# Patient Record
Sex: FEMALE | Race: WHITE | NOT HISPANIC OR LATINO | Employment: UNEMPLOYED | URBAN - NONMETROPOLITAN AREA
[De-identification: names, ages, dates, MRNs, and addresses within clinical notes are randomized per-mention and may not be internally consistent; named-entity substitution may affect disease eponyms.]

---

## 2017-05-10 ENCOUNTER — HOSPITAL ENCOUNTER (INPATIENT)
Facility: HOSPITAL | Age: 37
LOS: 5 days | Discharge: HOME | DRG: 885 | End: 2017-05-15
Attending: PSYCHIATRY & NEUROLOGY | Admitting: PSYCHIATRY & NEUROLOGY
Payer: MEDICAID

## 2017-05-10 ENCOUNTER — TRANSFERRED RECORDS (OUTPATIENT)
Dept: HEALTH INFORMATION MANAGEMENT | Facility: HOSPITAL | Age: 37
End: 2017-05-10

## 2017-05-10 ENCOUNTER — MEDICAL CORRESPONDENCE (OUTPATIENT)
Dept: HEALTH INFORMATION MANAGEMENT | Facility: HOSPITAL | Age: 37
End: 2017-05-10

## 2017-05-10 DIAGNOSIS — F41.1 GAD (GENERALIZED ANXIETY DISORDER): Primary | ICD-10-CM

## 2017-05-10 PROBLEM — F29 PSYCHOSIS (H): Status: ACTIVE | Noted: 2017-05-10

## 2017-05-10 LAB
ALT SERPL-CCNC: 32 IU/L (ref 12–65)
AST SERPL-CCNC: 10 IU/L (ref 15–37)
CREAT SERPL-MCNC: 0.79 MG/DL (ref 0.6–1.3)
GLUCOSE SERPL-MCNC: 93 MG/DL (ref 70–100)
POTASSIUM SERPL-SCNC: 4.1 MMOL/L (ref 3.5–5.1)

## 2017-05-10 PROCEDURE — 12400000 ZZH R&B MH

## 2017-05-10 RX ORDER — OLANZAPINE 10 MG/1
10 TABLET ORAL
Status: DISCONTINUED | OUTPATIENT
Start: 2017-05-10 | End: 2017-05-15 | Stop reason: HOSPADM

## 2017-05-10 RX ORDER — ACETAMINOPHEN 325 MG/1
650 TABLET ORAL EVERY 4 HOURS PRN
Status: DISCONTINUED | OUTPATIENT
Start: 2017-05-10 | End: 2017-05-15 | Stop reason: HOSPADM

## 2017-05-10 RX ORDER — ALUMINA, MAGNESIA, AND SIMETHICONE 2400; 2400; 240 MG/30ML; MG/30ML; MG/30ML
30 SUSPENSION ORAL EVERY 4 HOURS PRN
Status: DISCONTINUED | OUTPATIENT
Start: 2017-05-10 | End: 2017-05-15 | Stop reason: HOSPADM

## 2017-05-10 RX ORDER — OLANZAPINE 10 MG/2ML
10 INJECTION, POWDER, FOR SOLUTION INTRAMUSCULAR
Status: DISCONTINUED | OUTPATIENT
Start: 2017-05-10 | End: 2017-05-15 | Stop reason: HOSPADM

## 2017-05-10 RX ORDER — TRAZODONE HYDROCHLORIDE 50 MG/1
50 TABLET, FILM COATED ORAL
Status: DISCONTINUED | OUTPATIENT
Start: 2017-05-10 | End: 2017-05-15 | Stop reason: HOSPADM

## 2017-05-10 RX ORDER — HYDROXYZINE HYDROCHLORIDE 25 MG/1
25-50 TABLET, FILM COATED ORAL EVERY 4 HOURS PRN
Status: DISCONTINUED | OUTPATIENT
Start: 2017-05-10 | End: 2017-05-11 | Stop reason: ALTCHOICE

## 2017-05-10 NOTE — IP AVS SNAPSHOT
[facility]    [address] [place] [address]    Phone:  [phone]    Fax:  [phone]                                       After Visit Summary   5/10/2017    Marely Seo    MRN: [id number]           After Visit Summary Signature Page     I have received my discharge instructions, and my questions have been answered. I have discussed any challenges I see with this plan with the nurse or doctor.    ..........................................................................................................................................  Patient/Patient Representative Signature      ..........................................................................................................................................  Patient Representative Print Name and Relationship to Patient    ..................................................               ................................................  Date                                            Time    ..........................................................................................................................................  Reviewed by Signature/Title    ...................................................              ..............................................  Date                                                            Time
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
## 2017-05-11 PROCEDURE — 25000132 ZZH RX MED GY IP 250 OP 250 PS 637: Performed by: NURSE PRACTITIONER

## 2017-05-11 PROCEDURE — A9585 GADOBUTROL INJECTION: HCPCS | Mod: TC

## 2017-05-11 PROCEDURE — 12400000 ZZH R&B MH

## 2017-05-11 PROCEDURE — 99223 1ST HOSP IP/OBS HIGH 75: CPT | Performed by: NURSE PRACTITIONER

## 2017-05-11 PROCEDURE — 70553 MRI BRAIN STEM W/O & W/DYE: CPT | Mod: TC

## 2017-05-11 RX ORDER — CARBAMAZEPINE 200 MG/1
200 TABLET ORAL AT BEDTIME
Status: ON HOLD | COMMUNITY
End: 2017-05-15

## 2017-05-11 RX ORDER — GABAPENTIN 100 MG/1
100 CAPSULE ORAL 3 TIMES DAILY
Status: DISCONTINUED | OUTPATIENT
Start: 2017-05-12 | End: 2017-05-15 | Stop reason: HOSPADM

## 2017-05-11 RX ORDER — PRAZOSIN HYDROCHLORIDE 1 MG/1
1 CAPSULE ORAL AT BEDTIME
Status: DISCONTINUED | OUTPATIENT
Start: 2017-05-12 | End: 2017-05-15 | Stop reason: HOSPADM

## 2017-05-11 RX ORDER — HYDROXYZINE PAMOATE 50 MG/1
50 CAPSULE ORAL 2 TIMES DAILY PRN
Status: DISCONTINUED | OUTPATIENT
Start: 2017-05-11 | End: 2017-05-15 | Stop reason: HOSPADM

## 2017-05-11 RX ORDER — GADOBUTROL 604.72 MG/ML
7.5 INJECTION INTRAVENOUS ONCE
Status: COMPLETED | OUTPATIENT
Start: 2017-05-11 | End: 2017-05-11

## 2017-05-11 RX ORDER — CARBAMAZEPINE 200 MG/1
200 TABLET ORAL AT BEDTIME
Status: DISCONTINUED | OUTPATIENT
Start: 2017-05-11 | End: 2017-05-11 | Stop reason: ALTCHOICE

## 2017-05-11 RX ORDER — CARBAMAZEPINE 100 MG/1
100 TABLET, CHEWABLE ORAL DAILY
Status: DISCONTINUED | OUTPATIENT
Start: 2017-05-11 | End: 2017-05-11 | Stop reason: ALTCHOICE

## 2017-05-11 RX ADMIN — CARBAMAZEPINE 100 MG: 100 TABLET, CHEWABLE ORAL at 11:59

## 2017-05-11 RX ADMIN — GADOBUTROL 7.5 ML: 604.72 INJECTION INTRAVENOUS at 13:09

## 2017-05-11 RX ADMIN — ACETAMINOPHEN 650 MG: 325 TABLET, FILM COATED ORAL at 11:47

## 2017-05-11 RX ADMIN — HYDROXYZINE HYDROCHLORIDE 25 MG: 25 TABLET ORAL at 00:11

## 2017-05-11 RX ADMIN — TRAZODONE HYDROCHLORIDE 50 MG: 50 TABLET ORAL at 00:08

## 2017-05-11 RX ADMIN — HYDROXYZINE HYDROCHLORIDE 25 MG: 25 TABLET ORAL at 09:26

## 2017-05-11 RX ADMIN — OLANZAPINE 10 MG: 10 TABLET, FILM COATED ORAL at 11:20

## 2017-05-11 ASSESSMENT — ACTIVITIES OF DAILY LIVING (ADL)
DRESS: SCRUBS (BEHAVIORAL HEALTH)
ORAL_HYGIENE: INDEPENDENT
GROOMING: INDEPENDENT
GROOMING: INDEPENDENT
ORAL_HYGIENE: INDEPENDENT
DRESS: INDEPENDENT;SCRUBS (BEHAVIORAL HEALTH)
LAUNDRY: UNABLE TO COMPLETE

## 2017-05-11 NOTE — PLAN OF CARE
Problem: Discharge Planning  Goal: Discharge Planning (Adult, OB, Behavioral, Peds)  Writer was unable to meet with the pt today- will meet with her tomorrow.     2:30- writer met with pt and completed psychosocial assessment- pt was disorganized, mumbled her answers and a bit irritable.  Writer put in a request for a hospital clergy to visit with her per her request.

## 2017-05-11 NOTE — PLAN OF CARE
ADMISSION NOTE    Reason for admission .psychosis [facility]  Pt had been in treatment for meth abuse and states had been there a few days and started hearing voices  Pt cooperative at present  Showered at length at pt request upon arriving on unit and got meal for pt as pt stated needed snack      Full skin assessment: pt has no bruises noted no tattoos  Skin dry on both feet but no open sores    Patient arrived on unit from  accompanied by  on 5/10/2017  11:04 PM.   Status on arrival: pt disorganized thought process    Patient given tour of unit and Welcome to  unit papers given to patient, wanding completed,   Patient's legal status on arrival is . Appropriate legal rights discussed with and copy given to patient. Patient Bill of Rights discussed with and copy given to patient.   Patient denies SI, HI, and thoughts of self harm and contracts for safety while on unit.      Mary Naqvi  5/10/2017  11:04 PM

## 2017-05-11 NOTE — PROGRESS NOTES
05/11/17 0044   Patient Belongings   Did you bring any home meds/supplements to the hospital?  No   Patient Belongings clothing   Disposition of Belongings shoes,coat,papers,bras,tote,5books,makeupbag,dice,tape,purse,glasses,,$4.46,curling iron,s.case of colthes,slippers,blowdryer,t.mug,watch   Belongings Search Yes   Clothing Search Yes   Second Staff afternoons   General Info Comment n/a    List items sent to safe: onehundred cash,cellphone,ebt card 2 id cardsAll other belongings put in assigned cubby in belongings room.     I have reviewed my belongings list on admission and verify that it is correct.     Patient signature_______________________________    Second staff witness (if patient unable to sign) ______________________________       I have received all my belongings at discharge.    Patient signature________________________________    Kelli5/11/2017  12:48 AM
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
## 2017-05-11 NOTE — PLAN OF CARE
"Problem: Goal Outcome Summary  Goal: Goal Outcome Summary  Pt denies SI and HI. Says she is here to stop using meth. Pt says the voices come and go. Pt states, \"they tell me negative things, like if you were talking to me they would say she doesn't really like you, she's just doing her job.\" Pt is disorganized in conversation and mumbles at times. Pt is irritable with this writer, especially during admission assessment. Pt states, \"I already answered all these questions, don't you people talk to each in there.\" Pt complains of anxiety and requested a PRN. PRN atarax 25 mg given at 0926. Pt reports a little relief but says she is scared to try something else. Pt appears to be paranoid. Noon vitals were taken and temperature was 99.1 pt became upset states, \"I think I need an ultrasound of my belly, I had the flu 3 weeks ago and I don't think it's gone.\" Re took pt temperature using oral thermometer it was 97.3, pt states \"I still feel like something is not healthy in my body.\"  PRN Zyprexa 10 mg was given at 1120.Pt then says she has pain \"all over my body.\" Tylenol 650 mg was given at 1147. Pt went for an MRI at 1240, results have not come back yet. Pt states, \"my feet and back are hurting from walking on this hard floor and i'm a germaphobe.\" Gave pt her slippers as they have a non skid bottom and no strings. Pt has been up on the unit socializing with peers and attending groups. She is complaint with prescribed medications.      Problem: Additional Goals: Nursing Focus  Goal: 1. Patient Goal: Nursing Focus  Pt can wear her slippers from home as long as behaviors remain appropriate.   Pt wearing slippers      "
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
## 2017-05-11 NOTE — PLAN OF CARE
Face to face end of shift report received from RHODA Sumner. Rounding completed. Patient observed in room.     Fariba Mccormick  5/11/2017  7:48 AM

## 2017-05-11 NOTE — PLAN OF CARE
Face to face end of shift report received from pm RN. Rounding completed. Patient observed.     Burak Pressley  5/11/2017  12:19 AM

## 2017-05-11 NOTE — PLAN OF CARE
Face to face end of shift report received from Fariba DUONG. Rounding completed. Patient observed.     Rama Ramos  5/11/2017  5:59 PM

## 2017-05-12 PROCEDURE — 12400000 ZZH R&B MH

## 2017-05-12 PROCEDURE — 25000132 ZZH RX MED GY IP 250 OP 250 PS 637: Performed by: NURSE PRACTITIONER

## 2017-05-12 PROCEDURE — 99232 SBSQ HOSP IP/OBS MODERATE 35: CPT | Performed by: NURSE PRACTITIONER

## 2017-05-12 RX ADMIN — MAGNESIUM HYDROXIDE 30 ML: 400 SUSPENSION ORAL at 20:23

## 2017-05-12 RX ADMIN — ACETAMINOPHEN 650 MG: 325 TABLET, FILM COATED ORAL at 09:21

## 2017-05-12 RX ADMIN — HYDROXYZINE PAMOATE 50 MG: 50 CAPSULE ORAL at 08:08

## 2017-05-12 RX ADMIN — GABAPENTIN 100 MG: 100 CAPSULE ORAL at 20:23

## 2017-05-12 RX ADMIN — GABAPENTIN 100 MG: 100 CAPSULE ORAL at 13:20

## 2017-05-12 RX ADMIN — ACETAMINOPHEN 650 MG: 325 TABLET, FILM COATED ORAL at 15:52

## 2017-05-12 RX ADMIN — GABAPENTIN 100 MG: 100 CAPSULE ORAL at 08:08

## 2017-05-12 RX ADMIN — PRAZOSIN HYDROCHLORIDE 1 MG: 1 CAPSULE ORAL at 20:23

## 2017-05-12 ASSESSMENT — ACTIVITIES OF DAILY LIVING (ADL)
LAUNDRY: UNABLE TO COMPLETE
DRESS: INDEPENDENT;SCRUBS (BEHAVIORAL HEALTH)
GROOMING: INDEPENDENT
LAUNDRY: UNABLE TO COMPLETE
GROOMING: INDEPENDENT
ORAL_HYGIENE: INDEPENDENT
DRESS: INDEPENDENT;SCRUBS (BEHAVIORAL HEALTH)
ORAL_HYGIENE: INDEPENDENT

## 2017-05-12 NOTE — PROGRESS NOTES
[facility]  Psychiatric Progress Note      Impression:   This is a 36 year old female with no clear history of a psychotic disorder who presents with sudden onset of auditory hallucinations in the last couple days concurrent with admission to a new treatment program and cessation of substances, including alcohol. Today Marely is still a bit disorganized in speech and thought. She does admit to having some auditory hallucinations but does not say what it is. Seh does complain of some ear pain or feeling full. No obvious signs of infection, will continue to monitor. She is in agreement with continuing current medications. She does feel that they are working well. She has been attending some groups.     Educated regarding medication indications, risks, benefits, side effects, contraindications and possible interactions. Verbally expressed understanding.        DIagnoses:     Psychosis NOS  Major Depressive Disorder, recurrent, unspecified  Anxiety, NOS  Alcohol Dependence, with withdrawal, in very early remission  Methamphetamine Abuse, in remission    Attestation:  Patient has been seen and evaluated by me,  Amelia Fam NP          Interim History:   The patient's care was discussed with the treatment team and chart notes were reviewed.          Medications:     Current Facility-Administered Medications Ordered in Epic   Medication Dose Route Frequency Last Rate Last Dose     hydrOXYzine (VISTARIL) capsule 50 mg  50 mg Oral BID PRN   50 mg at 05/12/17 0808     gabapentin (NEURONTIN) capsule 100 mg  100 mg Oral TID   100 mg at 05/12/17 1320     prazosin (MINIPRESS) capsule 1 mg  1 mg Oral At Bedtime         acetaminophen (TYLENOL) tablet 650 mg  650 mg Oral Q4H PRN   650 mg at 05/12/17 0921     alum & mag hydroxide-simethicone (MYLANTA ES/MAALOX  ES) suspension 30 mL  30 mL Oral Q4H PRN         magnesium hydroxide (MILK OF MAGNESIA) suspension 30 mL  30 mL Oral At Bedtime PRN         traZODone  (DESYREL) tablet 50 mg  50 mg Oral At Bedtime PRN   50 mg at 05/11/17 0008     OLANZapine (zyPREXA) tablet 10 mg  10 mg Oral Q2H PRN   10 mg at 05/11/17 1120    Or     OLANZapine (zyPREXA) injection 10 mg  10 mg Intramuscular Q2H PRN         nicotine polacrilex (NICORETTE) gum 2-4 mg  2-4 mg Buccal Q1H PRN         No current Epic-ordered outpatient prescriptions on file.              10 point ROS negative        Allergies:   No Known Allergies         Psychiatric Examination:   /56  Pulse 99  Temp 97.9  F (36.6  C) (Tympanic)  Resp 18  SpO2 98%  Weight is 0 lbs 0 oz  There is no height or weight on file to calculate BMI.    Appearance:  awake, alert, adequately groomed and dressed in hospital scrubs  Attitude:  cooperative  Eye Contact:  good  Mood:  anxious  Affect:  appropriate and in normal range  Speech:  clear, coherent  Psychomotor Behavior:  no evidence of tardive dyskinesia, dystonia, or tics and intact station, gait and muscle tone  Thought Process:  disorganized  Associations:  loosening of associations present  Thought Content:  no evidence of suicidal ideation or homicidal ideation and auditory hallucinations present  Insight:  fair  Judgment:  fair  Oriented to:  time, person, and place  Attention Span and Concentration:  fair  Recent and Remote Memory:  fair  Fund of Knowledge: low-normal  Muscle Strength and Tone: normal  Gait and Station: Normal           Labs:     Results for orders placed or performed during the hospital encounter of 05/10/17 (from the past 48 hour(s))   MR Brain w/o & w Contrast    Narrative    MRI OF BRAIN    CLINICAL INFORMATION:  History of head trauma with new onset of  psychosis.    TECHNICAL INFORMATION:  Sagittal T1, coronal post contrast enhanced T1  and axial FLAIR, T2, diffusion, T1 and post-contrast enhanced  T1-weighted images.    INTERPRETATION:  Ventricles, sulci and basilar cisterns are normal in  size for a patient of this age.  No mass or midline shift  is seen.  There is no extra-axial fluid collection or focal hemorrhage.  No  abnormal enhancement is seen following contrast administration.    Normal flow void is seen in vessels at the skull base.  Orbits appear  normal and symmetrical.  There is no restricted diffusion.  Midline  structures are within normal limits.    IMPRESSION:  NO INTRACRANIAL MASS EFFECT, HEMORRHAGE, OR ISCHEMIA.  Exam Date: May 11, 2017 01:18:00 PM  Author: SHERON KIM  This report is final and signed                  Plan:   Will continue current medications and continue to assess status to see if she clears as a result of tegretol being discontinued.

## 2017-05-12 NOTE — PLAN OF CARE
Problem: Discharge Planning  Goal: Discharge Planning (Adult, OB, Behavioral, Peds)  Writer spoke with Kellen the treatment director at [facility]- [phone] who stated they are faxing over the requested records- they will accept pt back to the program IF her primary problem is chemical dependency and not mental health- they would need the notes sent to them and they will review to see if pt is appropriate for their program.  They may be able to pick pt up from bus stop in [place] if they have the staff available.

## 2017-05-12 NOTE — PLAN OF CARE
Problem: Discharge Planning  Goal: Discharge Planning (Adult, OB, Behavioral, Peds)  Writer left a message for the treatment director- Kellen at [facility]-[phone]- in [place]- to touch base about pt returning to program when she discharges.

## 2017-05-12 NOTE — PLAN OF CARE
Face to face end of shift report received from pm RN. Rounding completed. Patient observed.     Burak Pressley  5/11/2017  11:47 PM

## 2017-05-12 NOTE — PLAN OF CARE
Problem: Goal Outcome Summary  Goal: Goal Outcome Summary  Outcome: No Change  Patient has been sleeping all evening. Mumbles when she responds to questions and returns to sleep. Respirations regular. Denies any suicidal thoughts. Denies hallucinations at this time. Did leave message at [facility] to call unit for fax number as only had answering machine on at this time.  2000 Patient was up for snack. Denies any hallucinations. States she does feel better. Did not know the town she was in. Calm.    Problem: Additional Goals: Nursing Focus  Goal: 1. Patient Goal: Nursing Focus  Pt can wear her slippers from home as long as behaviors remain appropriate.   Outcome: No Change  Patient has remained in bed all evening. Not wearing slippers at this time.

## 2017-05-12 NOTE — PLAN OF CARE
"Problem: Goal Outcome Summary  Goal: Goal Outcome Summary  Pt has been up on the unit socializing and attending groups. She does appear to be more organized then yesterday. Speech is mumbled. She requested and was given 12 step and allon books from belongings. Pt rated anxiety 5/10 this morning and was given Visteral 50 mg at 0808, she reports some relief. Pt continues to complain of pain in feet and back. She requested and was given tylenol 650 mg at 0921. Pt was in OT group rolling a ball on bottom of feet to try relieve pain as well. Pt denies depression. States, \"I think they found the right medication for me. I feel much better then when I was at [facility].\" Pt is complaint with prescribed medications. Faxed RANULFO to [facility] for pt records. Pt is asking to get EBT card from [employer] to call and see if she got the money she was suppose to get. Will pass along.     Problem: Additional Goals: Nursing Focus  Goal: 1. Patient Goal: Nursing Focus  Pt can wear her slippers from home as long as behaviors remain appropriate.   Pt has slippers on      "

## 2017-05-12 NOTE — PLAN OF CARE
Problem: Thought Process Alteration (Adult)  Goal: Improved Thought Process  Pt will attend at least 50% of group.   Pt will verbalize a decrease/absense of voices.  Outcome: Therapy, progress toward functional goals is gradual  Pt has attending most all groups this shift.  Pt denies hallucinations.

## 2017-05-12 NOTE — PLAN OF CARE
Face to face end of shift report received from Escobar RN. Rounding completed. Patient observed in room and introduced to nursing for the shift.    Osmani Frias  5/12/2017  4:17 PM

## 2017-05-12 NOTE — PLAN OF CARE
Face to face end of shift report received from RHODA Sumner. Rounding completed. Patient observed in [facility].     Fariba Mccormick  5/12/2017  7:43 AM

## 2017-05-13 ENCOUNTER — TRANSFERRED RECORDS (OUTPATIENT)
Dept: HEALTH INFORMATION MANAGEMENT | Facility: HOSPITAL | Age: 37
End: 2017-05-13

## 2017-05-13 PROCEDURE — 25000132 ZZH RX MED GY IP 250 OP 250 PS 637: Performed by: NURSE PRACTITIONER

## 2017-05-13 PROCEDURE — 12400000 ZZH R&B MH

## 2017-05-13 RX ADMIN — ACETAMINOPHEN 650 MG: 325 TABLET, FILM COATED ORAL at 01:06

## 2017-05-13 RX ADMIN — ACETAMINOPHEN 650 MG: 325 TABLET, FILM COATED ORAL at 20:50

## 2017-05-13 RX ADMIN — GABAPENTIN 100 MG: 100 CAPSULE ORAL at 20:50

## 2017-05-13 RX ADMIN — PRAZOSIN HYDROCHLORIDE 1 MG: 1 CAPSULE ORAL at 20:50

## 2017-05-13 RX ADMIN — GABAPENTIN 100 MG: 100 CAPSULE ORAL at 08:17

## 2017-05-13 RX ADMIN — GABAPENTIN 100 MG: 100 CAPSULE ORAL at 13:46

## 2017-05-13 RX ADMIN — MAGNESIUM HYDROXIDE 30 ML: 400 SUSPENSION ORAL at 21:35

## 2017-05-13 ASSESSMENT — ACTIVITIES OF DAILY LIVING (ADL)
DRESS: INDEPENDENT;SCRUBS (BEHAVIORAL HEALTH)
GROOMING: INDEPENDENT
LAUNDRY: UNABLE TO COMPLETE
ORAL_HYGIENE: INDEPENDENT

## 2017-05-13 NOTE — PLAN OF CARE
"Problem: Goal Outcome Summary  Goal: Goal Outcome Summary  She is up on the unit and is social with others. Her conversation is tangential with flight of ideas. She jumps from topic to topic. She currently denies hearing voices but admits to voices in the past. She says \"I woke up depressed again this morning\".  She denies any suicidal ideations.     Problem: Additional Goals: Nursing Focus  Goal: 1. Patient Goal: Nursing Focus  Pt can wear her slippers from home as long as behaviors remain appropriate.   Outcome: Change based on patient need/priority Date Met:  05/13/17  Behavior is appropriate  Goal: 2. Patient Goal: Nursing Focus  Pt may use Peanut Spiky Foot Massager PRN and it to return to staff when done. Will be kept in belongings room.   She is using her foot massager today. She is agreeable to return it to staff when done.     Problem: Thought Process Alteration (Adult)  Goal: Improved Thought Process  Pt will attend at least 50% of group.   Pt will verbalize a decrease/absense of voices.   She is encouraged to attend groups. She currently denies hearing voices but admits to them in the past.      "

## 2017-05-13 NOTE — PROGRESS NOTES
MARELY AREVALO - Requested visit by . Marely shared some of her story and the many significant losses in her life (now with some continued grieving). She noted her additions and treatments with the hope to go to [place] into a women's treatment center and then return to work some place near home ([place], [place]).  Her spirituality is a mixture of  and Pentecostalism.  We closed our time together in prayer.  She requested I bring her a rosary to be put with her belongings to take with her.

## 2017-05-13 NOTE — PLAN OF CARE
Problem: Goal Outcome Summary  Goal: Goal Outcome Summary  BEHAVIORAL TEAM DISCUSSION     Continued Stay Criteria/Rationale: on 72 hour hold due to psychosis, she continues to be tangential and have flight of ideas, denies current hallucinations  Plan: stabilize on medications  Participants: nursing and psychiatric nurse practitioner  Summary/Recommendation: Continue to stabilize with medication and work on discharge planning.  Medical/Physical: see H&P  Progress: some improvement

## 2017-05-13 NOTE — PLAN OF CARE
Face to face end of shift report received from pm RN. Rounding completed. Patient observed.     Burak Pressley  5/12/2017  11:34 PM

## 2017-05-13 NOTE — PLAN OF CARE
Face to face end of shift report received from RHODA Sumner. Rounding completed. Patient observed in room.      Yvan Lal  5/13/2017  7:36 AM

## 2017-05-13 NOTE — PLAN OF CARE
Problem: Goal Outcome Summary  Goal: Goal Outcome Summary  Outcome: Improving  Pt has been up and active this shift. She attended two of  The groups this evening and spent some time reading in her room. She is social with peers when in the dayroom. I talked with her about her plan to return to [facility] next week.  Pt has tangential thinking and struggled at times to stay focused during our conversation. Her thought content is mostly reality based but is disorganized.  Pt is was complaining of foot pain and asked for Tylenol and then shortly after said that she needed the Tylenol because she felt hot. She did not have a fever. She is complaining of having constipation and plans to have some MoM and prune juice at bedtime. She denies having any suicidal thoughts and does not have insight into her mental illness but does have some insight into her addiction.    Pt did get MoM and Prune juice with her HS meds    Problem: Additional Goals: Nursing Focus  Goal: 1. Patient Goal: Nursing Focus  Pt can wear her slippers from home as long as behaviors remain appropriate.   Outcome: Improving  Pt has been wearing her slippers without issues.  Goal: 2. Patient Goal: Nursing Focus  Pt may use Peanut Spiky Foot Massager PRN and it to return to staff when done. Will be kept in med room.   Outcome: Improving  Pt has not requested to use her massager this shift.    Problem: Thought Process Alteration (Adult)  Goal: Improved Thought Process  Pt will attend at least 50% of group.   Pt will verbalize a decrease/absense of voices.   Outcome: Improving  Pt has attended one of the groups. She denies hearing voices and does not look pre-occupied.

## 2017-05-14 PROCEDURE — 25000132 ZZH RX MED GY IP 250 OP 250 PS 637: Performed by: NURSE PRACTITIONER

## 2017-05-14 PROCEDURE — 12400000 ZZH R&B MH

## 2017-05-14 PROCEDURE — 99232 SBSQ HOSP IP/OBS MODERATE 35: CPT | Performed by: NURSE PRACTITIONER

## 2017-05-14 RX ADMIN — VITAMIN D, TAB 1000IU (100/BT) 2000 UNITS: 25 TAB at 14:29

## 2017-05-14 RX ADMIN — GABAPENTIN 100 MG: 100 CAPSULE ORAL at 13:27

## 2017-05-14 RX ADMIN — GABAPENTIN 100 MG: 100 CAPSULE ORAL at 08:21

## 2017-05-14 RX ADMIN — Medication 1 TABLET: at 14:29

## 2017-05-14 RX ADMIN — ACETAMINOPHEN 650 MG: 325 TABLET, FILM COATED ORAL at 10:37

## 2017-05-14 RX ADMIN — PRAZOSIN HYDROCHLORIDE 1 MG: 1 CAPSULE ORAL at 20:49

## 2017-05-14 RX ADMIN — GABAPENTIN 100 MG: 100 CAPSULE ORAL at 20:49

## 2017-05-14 ASSESSMENT — ACTIVITIES OF DAILY LIVING (ADL)
DRESS: INDEPENDENT;SCRUBS (BEHAVIORAL HEALTH)
ORAL_HYGIENE: INDEPENDENT
GROOMING: INDEPENDENT
LAUNDRY: UNABLE TO COMPLETE

## 2017-05-14 NOTE — PLAN OF CARE
Face to face end of shift report received from Yvan. Rounding completed. Patient observed in group    Osmani Frias  5/13/2017  11:02 PM

## 2017-05-14 NOTE — PLAN OF CARE
Problem: Goal Outcome Summary  Goal: Goal Outcome Summary  Outcome: Improving  Patient observed resting in his room. Respirations equal and non-labored. Appeared to have slept all noc thus far.

## 2017-05-14 NOTE — PLAN OF CARE
Face to face end of shift report received from Yvan DUONG. Rounding completed. Patient observed in dayroom  Osmanijo-ann Brownet  5/14/2017  6:34 PM

## 2017-05-14 NOTE — PLAN OF CARE
Problem: Goal Outcome Summary  Goal: Goal Outcome Summary  Outcome: Improving  Pt has been active and social with peers all evening. She attended all but one of the available groups tonight. Her thinking and speech are somewhat more organized.  She told me that she feels that she is ready to go back to treatment.  Her affect is bright and she smiled often this shift.  She did have some generalized pain at bedtime and was given Tylenol with her HS medications. She was given some teaching on her Gabapentin and Prazosin and wants to be sure that her treatment center will approve of these medications.  She hopes she can go back as soon as Monday.    Problem: Additional Goals: Nursing Focus  Goal: 2. Patient Goal: Nursing Focus  Pt may use Peanut Spiky Foot Massager PRN and it to return to staff when done. Will be kept belongings room.   Outcome: Improving  `Pt has used her Spiky foot massager appropriatly    Problem: Thought Process Alteration (Adult)  Goal: Improved Thought Process  Pt will attend at least 50% of group.   Pt will verbalize a decrease/absense of voices.   Outcome: Improving  Pt did not attend groups but does deny voices.

## 2017-05-14 NOTE — PROGRESS NOTES
[facility]  Psychiatric Progress Note      Impression:   This is a 36 year old female with no clear history of a psychotic disorder who presents with sudden onset of auditory hallucinations in the last couple days concurrent with admission to a new treatment program and cessation of substances, including alcohol. Marely is more logical and linear today. She is more pleasant in conversation and is better able to track a linear thought process. She denies any hallucinations. Marely feel her medications are working well and she feels she is sleeping better.     Educated regarding medication indications, risks, benefits, side effects, contraindications and possible interactions. Verbally expressed understanding.        DIagnoses:     Psychosis NOS  Major Depressive Disorder, recurrent, unspecified  Anxiety, NOS  Alcohol Dependence, with withdrawal, in very early remission  Methamphetamine Abuse, in remission    Attestation:  Patient has been seen and evaluated by me,  Amelia Fam NP          Interim History:   The patient's care was discussed with the treatment team and chart notes were reviewed.          Medications:     Current Facility-Administered Medications Ordered in Epic   Medication Dose Route Frequency Last Rate Last Dose     hydrOXYzine (VISTARIL) capsule 50 mg  50 mg Oral BID PRN   50 mg at 05/12/17 0808     gabapentin (NEURONTIN) capsule 100 mg  100 mg Oral TID   100 mg at 05/12/17 1320     prazosin (MINIPRESS) capsule 1 mg  1 mg Oral At Bedtime         acetaminophen (TYLENOL) tablet 650 mg  650 mg Oral Q4H PRN   650 mg at 05/12/17 0921     alum & mag hydroxide-simethicone (MYLANTA ES/MAALOX  ES) suspension 30 mL  30 mL Oral Q4H PRN         magnesium hydroxide (MILK OF MAGNESIA) suspension 30 mL  30 mL Oral At Bedtime PRN         traZODone (DESYREL) tablet 50 mg  50 mg Oral At Bedtime PRN   50 mg at 05/11/17 0008     OLANZapine (zyPREXA) tablet 10 mg  10 mg Oral Q2H PRN   10 mg at 05/11/17  "1120    Or     OLANZapine (zyPREXA) injection 10 mg  10 mg Intramuscular Q2H PRN         nicotine polacrilex (NICORETTE) gum 2-4 mg  2-4 mg Buccal Q1H PRN         No current Epic-ordered outpatient prescriptions on file.              10 point ROS negative        Allergies:   No Known Allergies         Psychiatric Examination:   /54  Pulse 93  Temp 97.8  F (36.6  C) (Tympanic)  Resp 16  Ht 1.753 m (5' 9\")  Wt 87.2 kg (192 lb 3.2 oz)  SpO2 99%  BMI 28.38 kg/m2  Weight is 192 lbs 3.2 oz  Body mass index is 28.38 kg/(m^2).    Appearance:  awake, alert, adequately groomed and dressed in hospital scrubs  Attitude: remains  cooperative  Eye Contact:  good  Mood: less anxious  Affect: mood congruent  Speech:  clear, coherent  Psychomotor Behavior:  no evidence of tardive dyskinesia, dystonia, or tics and intact station, gait and muscle tone  Thought Process: more linear  Associations: improved, more logical and linear  Thought Content:  no evidence of suicidal ideation or homicidal ideation and auditory hallucinations present  Insight:  fair  Judgment:  fair  Oriented to:  time, person, and place  Attention Span and Concentration:  fair  Recent and Remote Memory:  fair  Fund of Knowledge: low-normal  Muscle Strength and Tone: normal  Gait and Station: Normal           Labs:     No results found for this or any previous visit (from the past 48 hour(s)).             Plan:   Vitamin D started   Vitamin B supplement started       "

## 2017-05-14 NOTE — PLAN OF CARE
Face to face end of shift report received from RHODA Oliveira. Rounding completed. Patient observed in [facility].     Yvan Lal  5/14/2017  7:44 AM

## 2017-05-14 NOTE — PLAN OF CARE
Problem: Goal Outcome Summary  Goal: Goal Outcome Summary  She is up on the unit and is social with others. She continues with some depression. She denies any suicidal thinking. She denies having hallucinations. She is maintaining appropriate boundaries with staff and peers. She talks of her feet hurting and feeling swollen. Feet examined. She has no edema. Skin on the ends of her toes is dry. Patient given lotion for her feet. She does have some anxiety today and is writing in her journal.     Problem: Additional Goals: Nursing Focus  Goal: 2. Patient Goal: Nursing Focus  Pt may use Peanut Spiky Foot Massager PRN and it to return to staff when done. Will be kept belongings room.   She is using her foot massager while sitting in the lounge.    Problem: Thought Process Alteration (Adult)  Goal: Improved Thought Process  Pt will attend at least 50% of group.   Pt will verbalize a decrease/absense of voices.   Up on the unit. Sitting in the lounge. Denies hallucinations.

## 2017-05-15 VITALS
SYSTOLIC BLOOD PRESSURE: 108 MMHG | DIASTOLIC BLOOD PRESSURE: 63 MMHG | WEIGHT: 192.2 LBS | OXYGEN SATURATION: 97 % | BODY MASS INDEX: 28.47 KG/M2 | RESPIRATION RATE: 14 BRPM | TEMPERATURE: 98 F | HEART RATE: 82 BPM | HEIGHT: 69 IN

## 2017-05-15 PROCEDURE — 25000132 ZZH RX MED GY IP 250 OP 250 PS 637: Performed by: NURSE PRACTITIONER

## 2017-05-15 PROCEDURE — 99239 HOSP IP/OBS DSCHRG MGMT >30: CPT | Performed by: NURSE PRACTITIONER

## 2017-05-15 RX ORDER — PRAZOSIN HYDROCHLORIDE 1 MG/1
1 CAPSULE ORAL AT BEDTIME
Qty: 30 CAPSULE | Refills: 0 | Status: ON HOLD | OUTPATIENT
Start: 2017-05-15 | End: 2022-08-24

## 2017-05-15 RX ORDER — HYDROXYZINE PAMOATE 50 MG/1
50 CAPSULE ORAL 2 TIMES DAILY PRN
Qty: 60 CAPSULE | Refills: 0 | Status: ON HOLD | OUTPATIENT
Start: 2017-05-15 | End: 2022-08-24

## 2017-05-15 RX ORDER — GABAPENTIN 100 MG/1
100 CAPSULE ORAL 3 TIMES DAILY
Qty: 90 CAPSULE | Refills: 0 | Status: ON HOLD | OUTPATIENT
Start: 2017-05-15 | End: 2022-08-24

## 2017-05-15 RX ADMIN — ACETAMINOPHEN 650 MG: 325 TABLET, FILM COATED ORAL at 04:55

## 2017-05-15 RX ADMIN — GABAPENTIN 100 MG: 100 CAPSULE ORAL at 08:56

## 2017-05-15 RX ADMIN — Medication 1 TABLET: at 08:56

## 2017-05-15 RX ADMIN — GABAPENTIN 100 MG: 100 CAPSULE ORAL at 13:56

## 2017-05-15 RX ADMIN — VITAMIN D, TAB 1000IU (100/BT) 2000 UNITS: 25 TAB at 08:56

## 2017-05-15 ASSESSMENT — ACTIVITIES OF DAILY LIVING (ADL)
LAUNDRY: UNABLE TO COMPLETE
GROOMING: INDEPENDENT
ORAL_HYGIENE: INDEPENDENT
DRESS: SCRUBS (BEHAVIORAL HEALTH);INDEPENDENT

## 2017-05-15 NOTE — PLAN OF CARE
"Problem: Goal Outcome Summary  Goal: Goal Outcome Summary  Patient denies SI, HI, hallucinations.  Patient is fixated on discharge this shift.  Affect is flat, she is irritable and tense.  Medication compliant, education given on [employer]ip[employer].  Patient is not attending groups.  She has been making multiple phone calls this shift attempting to find placement at a treatment center.  \"I won't go somewhere that strips me down like you all did.  I know my rights.\"    Problem: Additional Goals: Nursing Focus  Goal: 2. Patient Goal: Nursing Focus  Pt may use Peanut Spiky Foot Massager PRN and it to return to staff when done. Will be kept belongings room.   Patient allowed to use    Problem: Thought Process Alteration (Adult)  Goal: Improved Thought Process  Pt will attend at least 50% of group.   Pt will verbalize a decrease/absense of voices.   Patient encouraged to attend groups      "

## 2017-05-15 NOTE — PLAN OF CARE
"Problem: Discharge Planning  Goal: Discharge Planning (Adult, OB, Behavioral, Peds)  Writer spoke with Kellen from [facility] [place]- [phone] who stated that they are concerned because pt's behaviors were present before she was started on the new medication.  They believe she might be better suited for MH treatment- their Nurse Practitioner is there today and they want the weekend's notes faxed so the NP can review and they can determine if they are going to accept back to the program.  Writer let them know pt has cleared, been doing better, attending groups and is ready to discharge.    [facility] called back and stated they reviewed the notes and will not accept pt back to program.      Writer let the pt know that she could not go back to [facility]- pt stated, \"Well, then I am out- I am not staying past my hold- I will get [place] transportation to come pick me up.\"   "

## 2017-05-15 NOTE — PLAN OF CARE
Problem: Discharge Planning  Goal: Discharge Planning (Adult, OB, Behavioral, Peds)  Outcome: No Change  Sent notes to [facility], fax [phone], as requested.

## 2017-05-15 NOTE — PROGRESS NOTES
Face to face end of shift report received from desire rn at 2300 report.. Rounding completed. Patient observed.     Yanni Smith  5/15/2017  1:48 AM

## 2017-05-15 NOTE — DISCHARGE INSTRUCTIONS
Behavioral Discharge Planning and Instructions    Summary: Marely was admitted for psychosis.     Main Diagnosis: Psychosis NOS, Major Depressive Disorder, recurrent, unspecified, Anxiety, NOS, Alcohol Dependence, with withdrawal, in very early remission, Methamphetamine Abuse, in remission    Major Treatments, Procedures and Findings: Stabilize with medications, connect with community programs.     Symptoms to Report: feeling more aggressive, increased confusion, losing more sleep, mood getting worse or thoughts of suicide    Lifestyle Adjustment: Take all medications as prescribed, meet with doctor/ medication provider, out patient therapist, , and [facility] worker as scheduled. Abstain from alcohol or any unprescribed drugs.     Psychiatry Follow-up:    [facility]  [address]  [place] [address]   [phone]    [facility]: Director: Kellen [phone]  [address]  [place] [address]  Phone: [phone]  Fax: [phone]    Resources:   Crisis Intervention: 823.452.3653 or 594-901-7562 (TTY: 319.837.9601).  Call anytime for help.  National Many on Mental Illness (www.[place].katey.org): 946.834.3828 or 839-003-2509.  Alcoholics Anonymous (www.alcoholics-anonymous.org): Check your phone book for your local chapter.  Suicide Awareness Voices of Education (SAVE) (www.save.org): 504-025-FEOO (4083)  National Suicide Prevention Line (www.mentalhealthmn.org): 140-371-FWSX (5147)  Mental Health Consumer/Survivor Network of [place] (www.mhcsn.net): 346.978.8296 or 149-707-8337  Mental Health Association of [place] (www.mentalhealth.org): 605.127.3306 or 119-609-3763    General Medication Instructions:   See your medication sheet(s) for instructions.   Take all medicines as directed.  Make no changes unless your doctor suggests them.   Go to all your doctor visits.  Be sure to have all your required lab tests. This way, your medicines can be refilled on time.  Do not use any  drugs not prescribed by your doctor.  Avoid alcohol.    [place] Area:  [facility], Crisis stabilization \A Chronology of [place] Hospitals\""- [phone]  [facility] Crisis Line: 1-476.726.4795  Advocates For Family Peace: 884-7270  Sexual Assault Program of [facility]: 345.819.1499 or 1-250.217.6581  [place] Battered Women's Program: 2-171-142-1103 Ext: 279       Calls answered Mon-Fri-8:00 am--4:30 pm    [place]:  Advocates for Family Peace: 0-010-300-8284  [facility] first call for help: 9-156-047-4942  [facility]:  (664) 264-7464      [place] Area:  Warm Line: 1-919.795.9453       Calls answered Tuesday--Saturday 4:00 pm--10:00 pm  [facility] Crisis Line - 940.223.8884  [facility] Crisis Stabilization 687-057-6630    [place] Statewide:  [place] Crisis and Referral Services: 1-872.309.1057  National Suicide Prevention Lifeline: 6-415-373-TALK (0082)   - rip3taej- Text  Life  to 83996  First Call for Help: 2-1-1  [facility] Helpline- 7-612-QJUP-HELP

## 2017-05-15 NOTE — PLAN OF CARE
Problem: Discharge Planning  Goal: Discharge Planning (Adult, OB, Behavioral, Peds)  Pt is discharging at the recommendation of the treatment team. Pt is discharging to parent's home transported by parents. Pt denies having any thoughts of hurting themself or anyone else. Pt denies anxiety or depression. Pt has follow up with [facility]. Discharge instructions, including; demographic sheet, psychiatric evaluation, discharge summary, and AVS were faxed to these next level of care providers.

## 2017-05-15 NOTE — PLAN OF CARE
Problem: Goal Outcome Summary  Goal: Goal Outcome Summary  0100 in bed with easy respirations presenting sleeping.0300 awake in bed. Wanted some prune juice. We did not have any but she accepted some grape juice. 0330 then into the shower. She said that the room mate woke her up. Did offer the patient a room change and she said no. Patient said that she is going today. She did ask for and given her prn tylenol 650mg for overall body aches at 0455. She is going to rest in her bed now.

## 2017-05-15 NOTE — PLAN OF CARE
Face to face end of shift report received from Yanni AGUIRRE RN. Rounding completed. Patient observed in [facility].    Geovanna Ramirez  5/15/2017  8:06 AM

## 2017-05-15 NOTE — PLAN OF CARE
Problem: Goal Outcome Summary  Goal: Goal Outcome Summary  Outcome: Improving  Pt has been up and active all shift. She attended all the available groups and was social with peers in the dayroom.  She reported that she feels that her thinking is more organized.  We discussed the triggers that may have contributed to her having disorganized thinking.  She said that she does not know exactly. She feels that she is ready for discharge back to her treatment center. She denied voices or feeling paranoid. Her affect is bright and she smiles a great deal.  She was given more teaching on her medications.      Problem: Additional Goals: Nursing Focus  Goal: 2. Patient Goal: Nursing Focus  Pt may use Peanut Spiky Foot Massager PRN and it to return to staff when done. Will be kept belongings room.   Outcome: Improving  Pt has used her foot massager taqueria    Problem: Thought Process Alteration (Adult)  Goal: Improved Thought Process  Pt will attend at least 50% of group.   Pt will verbalize a decrease/absense of voices.   Outcome: Improving  Pt has attended all groups and denies voices

## 2017-05-15 NOTE — DISCHARGE SUMMARY
"Psychiatric Discharge Summary    Marely Seo MRN# [id number]   Age: 36 year old YOB: 1980     Date of Admission:  5/10/2017  Date of Discharge:  5/15/2017  Admitting Physician:  Marquez Spivey MD  Discharge Physician:  Amelia Fam NP          Event Leading to Hospitalization and Hospital Stay   Marely Seo is a 36 year old female who presented via [facility] ED with reports of sudden onset of symptoms of severe auditory hallucinations for last couple days with rapid progression. She reported symptoms triggered by recent admission into treatment and cessation of methamphetamine use. No previous diagnosis or treatment for psychotic disorder. Parents reported that there is no history of mental health issues.      Marely is extremely disorganized and somewhat paranoid, but her insight into this is interestingly good. She is unable to have a conversation in her room, secondary to fearing her \"reaction in closed quarters,\" and requests that we talk in the lounge area near the window. She does endorse auditory hallucinations, which she describes as a mixture of voices and her own racing thoughts. She tells me, \"I have monkey on my shoulder,that's what I have.\" When asked about previous mental health issues, she does report being diagnosed with depression and anxiety, \"possibly induced from drug use,\" and believes that at one time she was treated with Sertraline, \"or strattera - maybe I need ADHD meds?\" Marely also indicates that she has previously had auditory hallucinations, \"usually when I quit drinking and drugging and I'm introduced to an environment that I'm not comfortable with.\" When asked about treatment, or how the voices generally resolve and how long that takes, she answers, \"they make me cry.\" She then tells me the voices are there because of her daughter's father who recently lost a son, \"it's bad shit and the reality is coming down on me and I've had a brain injury.\" When " "asked to elaborate on that, she requests we wait so she can give full detail. She then talks more about her alcohol and methamphetamine use, previous 10 CD treatments, and attendance at [facility] because she believes her addiction is \"a family issue and I'm just trying to understand it.\" She has not used any substances in a week. Marely does go back to the brain injury, telling me, \"we need to talk about the marijuana. 16. I fell and hit my head and I had a seizure. There was an egg. I was at the movies. I don't think anyone ever made sure my brain was ok.\"  Tegretol was discontinued as it has ototoxic properties that can induce auditory hallucinations. Gabapentin and prazosin were started and symptoms seemed to diminish quickly. Marely denied any paranoia or auditory hallucinations by discharge. She was not able to return to her inpatient CD treatment facility due to having a primary MH diagnosis. She is planning on returning to her mothers and working with the [facility] to find another treatment facility that will work with dual diagnosis.          At time of discharge, there is no evidence that patient is in immediate danger of self or others.        DIagnoses:     Psychosis NOS  Major Depressive Disorder, recurrent, unspecified  Anxiety, NOS  Alcohol Dependence, with withdrawal, in very early remission  Methamphetamine Abuse, in remission         Labs:   No results found for this or any previous visit (from the past 24 hour(s)).               Discharge Medications:     Current Discharge Medication List      START taking these medications    Details   gabapentin (NEURONTIN) 100 MG capsule Take 1 capsule (100 mg) by mouth 3 times daily  Qty: 90 capsule, Refills: 0    Associated Diagnoses: GORDY (generalized anxiety disorder)      prazosin (MINIPRESS) 1 MG capsule Take 1 capsule (1 mg) by mouth At Bedtime  Qty: 30 capsule, Refills: 0    Associated Diagnoses: GORDY (generalized anxiety disorder)         CONTINUE these medications " which have CHANGED    Details   hydrOXYzine (VISTARIL) 50 MG capsule Take 1 capsule (50 mg) by mouth 2 times daily as needed for anxiety  Qty: 60 capsule, Refills: 0    Associated Diagnoses: GORDY (generalized anxiety disorder)         STOP taking these medications       CARBAMAZEPINE PO Comments:   Reason for Stopping:         carBAMazepine (TEGRETOL) 200 MG tablet Comments:   Reason for Stopping:                 Justification for dual anti-psychotic use: not applicable       Psychiatric Examination:   Appearance:  awake, alert, adequately groomed and dressed in hospital scrubs  Attitude:  cooperative  Eye Contact:  good  Mood:  good  Affect:  appropriate and in normal range  Speech:  clear, coherent  Psychomotor Behavior:  no evidence of tardive dyskinesia, dystonia, or tics and intact station, gait and muscle tone  Thought Process:  logical, linear and goal oriented  Associations:  no loose associations  Thought Content:  no evidence of suicidal ideation or homicidal ideation, no evidence of psychotic thought, no auditory hallucinations present and no visual hallucinations present  Insight:  good  Judgment:  intact  Oriented to:  time, person, and place  Attention Span and Concentration:  intact  Recent and Remote Memory:  intact  Fund of Knowledge: low-normal  Muscle Strength and Tone: normal  Gait and Station: Normal  Perception: No perceptual disturbances noted       Discharge Plan:   Psychiatry Follow-up:     [facility]  [address]  [place] [address]   [phone]     [facility]: Director: Kellen [phone]  [facility] [address]  Phone: [phone]  Fax: [phone]    Attestation:  The patient has been seen and evaluated by me,  Amelia Fam NP         Discharge Services Provided:    60 minutes spent on discharge services, including:  Final examination of patient.  Review and discussion of Hospital stay.  Instructions for continued outpatient  care/goals.  Preparation of discharge records.  Preparation of medications refills and new prescriptions.  Preparation of Applicable referral forms.

## 2017-05-16 NOTE — PLAN OF CARE
Face to face end of shift report received from Geovanna CABRERA RN. Rounding completed. Patient observed.     Isidoro Loya  5/15/2017  9:56 PM

## 2017-05-16 NOTE — PLAN OF CARE
Discharge Note    Patient Discharged to home on 5/15/2017 9:59 PM via Private Car accompanied by cousin and staff to the door.     Patient informed of discharge instructions in AVS. patient verbalizes understanding and denies having any questions pertaining to AVS. Patient stable at time of discharge. Patient denies SI, HI, and thoughts of self harm at time of discharge. All personal belongings returned to patient. Discharge prescriptions sent to [facility] via electronic communication. Patient took home meds from [facility] pharmacy which were delivered to the unit. Psych evaluation, history and physical, AVS, and discharge summary faxed to next level of care by  MIRANDA.     Isidoro Loya  5/15/2017  9:59 PM

## 2022-08-23 ENCOUNTER — TRANSFERRED RECORDS (OUTPATIENT)
Dept: HEALTH INFORMATION MANAGEMENT | Facility: CLINIC | Age: 42
End: 2022-08-23

## 2022-08-24 ENCOUNTER — HOSPITAL ENCOUNTER (INPATIENT)
Facility: HOSPITAL | Age: 42
LOS: 7 days | Discharge: HOME | End: 2022-08-31
Attending: STUDENT IN AN ORGANIZED HEALTH CARE EDUCATION/TRAINING PROGRAM | Admitting: STUDENT IN AN ORGANIZED HEALTH CARE EDUCATION/TRAINING PROGRAM
Payer: COMMERCIAL

## 2022-08-24 ENCOUNTER — TRANSFERRED RECORDS (OUTPATIENT)
Dept: BEHAVIORAL HEALTH | Facility: HOSPITAL | Age: 42
End: 2022-08-24

## 2022-08-24 DIAGNOSIS — T50.905A WEIGHT GAIN DUE TO MEDICATION: ICD-10-CM

## 2022-08-24 DIAGNOSIS — F20.3 UNDIFFERENTIATED SCHIZOPHRENIA (H): Primary | ICD-10-CM

## 2022-08-24 DIAGNOSIS — R63.5 WEIGHT GAIN DUE TO MEDICATION: ICD-10-CM

## 2022-08-24 PROBLEM — R44.3 HALLUCINATIONS: Status: ACTIVE | Noted: 2020-12-30

## 2022-08-24 PROBLEM — F15.20 METHAMPHETAMINE ADDICTION (H): Status: ACTIVE | Noted: 2020-12-30

## 2022-08-24 PROBLEM — F23 PSYCHOTIC EPISODE (H): Status: ACTIVE | Noted: 2017-06-08

## 2022-08-24 PROBLEM — F15.20 OTHER STIMULANT DEPENDENCE, UNCOMPLICATED (H): Status: ACTIVE | Noted: 2020-12-30

## 2022-08-24 PROBLEM — S83.512D RUPTURE OF ANTERIOR CRUCIATE LIGAMENT OF LEFT KNEE, SUBSEQUENT ENCOUNTER: Status: ACTIVE | Noted: 2020-07-17

## 2022-08-24 PROBLEM — R19.8 ABDOMINAL WEAKNESS: Status: ACTIVE | Noted: 2018-02-20

## 2022-08-24 PROBLEM — M46.1 SACROILIITIS (H): Status: ACTIVE | Noted: 2018-12-10

## 2022-08-24 PROBLEM — F10.10 ALCOHOL ABUSE: Status: ACTIVE | Noted: 2022-06-19

## 2022-08-24 PROBLEM — F19.10 SUBSTANCE ABUSE (H): Status: ACTIVE | Noted: 2017-06-08

## 2022-08-24 PROBLEM — E78.6 HYPERLIPIDEMIA WITH LOW HDL: Status: ACTIVE | Noted: 2018-05-17

## 2022-08-24 PROBLEM — F10.20 ALCOHOLISM (H): Status: ACTIVE | Noted: 2018-08-16

## 2022-08-24 PROBLEM — F43.89 OTHER REACTIONS TO SEVERE STRESS: Status: ACTIVE | Noted: 2021-09-20

## 2022-08-24 PROBLEM — M54.9 BACKACHE: Status: ACTIVE | Noted: 2022-08-24

## 2022-08-24 PROBLEM — R87.810 CERVICAL HIGH RISK HPV (HUMAN PAPILLOMAVIRUS) TEST POSITIVE: Status: ACTIVE | Noted: 2018-05-17

## 2022-08-24 PROBLEM — M47.817 FACET ARTHROPATHY, LUMBOSACRAL: Status: ACTIVE | Noted: 2018-09-13

## 2022-08-24 PROBLEM — E78.5 HYPERLIPIDEMIA WITH LOW HDL: Status: ACTIVE | Noted: 2018-05-17

## 2022-08-24 PROBLEM — F32.9 MAJOR DEPRESSION: Status: ACTIVE | Noted: 2022-08-24

## 2022-08-24 PROBLEM — F41.9 ANXIETY: Status: ACTIVE | Noted: 2020-12-30

## 2022-08-24 PROBLEM — F15.259: Status: ACTIVE | Noted: 2021-09-20

## 2022-08-24 PROBLEM — F23 ACUTE PSYCHOSIS (H): Status: ACTIVE | Noted: 2017-06-08

## 2022-08-24 PROBLEM — F25.1 SCHIZOAFFECTIVE DISORDER, DEPRESSIVE TYPE (H): Status: ACTIVE | Noted: 2021-12-28

## 2022-08-24 PROBLEM — F19.20 SUBSTANCE ADDICTION (H): Status: ACTIVE | Noted: 2020-08-17

## 2022-08-24 PROBLEM — F15.10 METHAMPHETAMINE ABUSE (H): Status: ACTIVE | Noted: 2022-06-19

## 2022-08-24 LAB
ALBUMIN SERPL-MCNC: 3.5 G/DL (ref 3.4–5)
ANION GAP SERPL CALCULATED.3IONS-SCNC: 5 MMOL/L (ref 3–14)
BUN SERPL-MCNC: 9 MG/DL (ref 7–30)
CALCIUM SERPL-MCNC: 9.2 MG/DL (ref 8.5–10.1)
CHLORIDE BLD-SCNC: 111 MMOL/L (ref 94–109)
CO2 SERPL-SCNC: 23 MMOL/L (ref 20–32)
CREAT SERPL-MCNC: 0.8 MG/DL (ref 0.52–1.04)
GFR SERPL CREATININE-BSD FRML MDRD: >90 ML/MIN/1.73M2
GLUCOSE BLD-MCNC: 116 MG/DL (ref 70–99)
PHOSPHATE SERPL-MCNC: 3.5 MG/DL (ref 2.5–4.5)
POTASSIUM BLD-SCNC: 4 MMOL/L (ref 3.4–5.3)
POTASSIUM BLD-SCNC: 4 MMOL/L (ref 3.4–5.3)
SODIUM SERPL-SCNC: 139 MMOL/L (ref 133–144)

## 2022-08-24 PROCEDURE — 250N000013 HC RX MED GY IP 250 OP 250 PS 637: Performed by: NURSE PRACTITIONER

## 2022-08-24 PROCEDURE — 124N000001 HC R&B MH

## 2022-08-24 PROCEDURE — 99223 1ST HOSP IP/OBS HIGH 75: CPT | Mod: AI | Performed by: NURSE PRACTITIONER

## 2022-08-24 PROCEDURE — 99222 1ST HOSP IP/OBS MODERATE 55: CPT | Performed by: NURSE PRACTITIONER

## 2022-08-24 PROCEDURE — 82310 ASSAY OF CALCIUM: CPT | Performed by: NURSE PRACTITIONER

## 2022-08-24 PROCEDURE — 36415 COLL VENOUS BLD VENIPUNCTURE: CPT | Performed by: NURSE PRACTITIONER

## 2022-08-24 PROCEDURE — 84132 ASSAY OF SERUM POTASSIUM: CPT | Performed by: NURSE PRACTITIONER

## 2022-08-24 RX ORDER — SIMETHICONE 80 MG
80 TABLET,CHEWABLE ORAL EVERY 6 HOURS PRN
Status: DISCONTINUED | OUTPATIENT
Start: 2022-08-24 | End: 2022-08-31 | Stop reason: HOSPADM

## 2022-08-24 RX ORDER — MAGNESIUM HYDROXIDE/ALUMINUM HYDROXICE/SIMETHICONE 120; 1200; 1200 MG/30ML; MG/30ML; MG/30ML
30 SUSPENSION ORAL EVERY 4 HOURS PRN
Status: DISCONTINUED | OUTPATIENT
Start: 2022-08-24 | End: 2022-08-31 | Stop reason: HOSPADM

## 2022-08-24 RX ORDER — VALACYCLOVIR HYDROCHLORIDE 500 MG/1
1000 TABLET, FILM COATED ORAL DAILY
Status: COMPLETED | OUTPATIENT
Start: 2022-08-24 | End: 2022-08-28

## 2022-08-24 RX ORDER — PALIPERIDONE 3 MG/1
3 TABLET, EXTENDED RELEASE ORAL AT BEDTIME
Status: DISCONTINUED | OUTPATIENT
Start: 2022-08-24 | End: 2022-08-25

## 2022-08-24 RX ORDER — ACETAMINOPHEN 325 MG/1
650 TABLET ORAL EVERY 4 HOURS PRN
Status: DISCONTINUED | OUTPATIENT
Start: 2022-08-24 | End: 2022-08-31 | Stop reason: HOSPADM

## 2022-08-24 RX ORDER — AMOXICILLIN 250 MG
1 CAPSULE ORAL 2 TIMES DAILY PRN
Status: DISCONTINUED | OUTPATIENT
Start: 2022-08-24 | End: 2022-08-31 | Stop reason: HOSPADM

## 2022-08-24 RX ORDER — PHENOL 1.4 %
20-30 AEROSOL, SPRAY (ML) MUCOUS MEMBRANE
COMMUNITY
Start: 2022-01-02

## 2022-08-24 RX ORDER — QUETIAPINE FUMARATE 100 MG/1
100 TABLET, FILM COATED ORAL AT BEDTIME
Status: DISCONTINUED | OUTPATIENT
Start: 2022-08-24 | End: 2022-08-25

## 2022-08-24 RX ORDER — MEDROXYPROGESTERONE ACETATE 150 MG/ML
150 INJECTION, SUSPENSION INTRAMUSCULAR
COMMUNITY
Start: 2022-04-18 | End: 2023-06-12

## 2022-08-24 RX ORDER — OLANZAPINE 10 MG/2ML
10 INJECTION, POWDER, FOR SOLUTION INTRAMUSCULAR 3 TIMES DAILY PRN
Status: DISCONTINUED | OUTPATIENT
Start: 2022-08-24 | End: 2022-08-25

## 2022-08-24 RX ORDER — HYDROXYZINE HYDROCHLORIDE 25 MG/1
25 TABLET, FILM COATED ORAL EVERY 4 HOURS PRN
Status: DISCONTINUED | OUTPATIENT
Start: 2022-08-24 | End: 2022-08-31 | Stop reason: HOSPADM

## 2022-08-24 RX ORDER — LANOLIN ALCOHOL/MO/W.PET/CERES
3 CREAM (GRAM) TOPICAL
Status: DISCONTINUED | OUTPATIENT
Start: 2022-08-24 | End: 2022-08-31 | Stop reason: HOSPADM

## 2022-08-24 RX ORDER — OLANZAPINE 10 MG/1
10 TABLET ORAL 3 TIMES DAILY PRN
Status: DISCONTINUED | OUTPATIENT
Start: 2022-08-24 | End: 2022-08-25

## 2022-08-24 RX ORDER — METFORMIN HCL 500 MG
500 TABLET, EXTENDED RELEASE 24 HR ORAL
Status: DISCONTINUED | OUTPATIENT
Start: 2022-08-24 | End: 2022-08-31 | Stop reason: HOSPADM

## 2022-08-24 RX ORDER — QUETIAPINE FUMARATE 100 MG/1
100 TABLET, FILM COATED ORAL AT BEDTIME
Status: ON HOLD | COMMUNITY
Start: 2022-07-07 | End: 2022-08-30

## 2022-08-24 RX ADMIN — PALIPERIDONE 3 MG: 3 TABLET, EXTENDED RELEASE ORAL at 20:08

## 2022-08-24 RX ADMIN — VALACYCLOVIR HYDROCHLORIDE 1000 MG: 500 TABLET, FILM COATED ORAL at 13:22

## 2022-08-24 RX ADMIN — HYDROXYZINE HYDROCHLORIDE 25 MG: 25 TABLET, FILM COATED ORAL at 15:19

## 2022-08-24 RX ADMIN — MELATONIN 3 MG: 3 TAB ORAL at 20:13

## 2022-08-24 RX ADMIN — METFORMIN HYDROCHLORIDE 500 MG: 500 TABLET, EXTENDED RELEASE ORAL at 16:34

## 2022-08-24 RX ADMIN — OLANZAPINE 10 MG: 10 TABLET ORAL at 15:19

## 2022-08-24 RX ADMIN — MELATONIN 3 MG: 3 TAB ORAL at 02:49

## 2022-08-24 RX ADMIN — HYDROXYZINE HYDROCHLORIDE 25 MG: 25 TABLET, FILM COATED ORAL at 20:08

## 2022-08-24 RX ADMIN — OLANZAPINE 10 MG: 10 TABLET ORAL at 02:49

## 2022-08-24 RX ADMIN — QUETIAPINE 100 MG: 100 TABLET, FILM COATED ORAL at 20:08

## 2022-08-24 ASSESSMENT — ACTIVITIES OF DAILY LIVING (ADL)
ADLS_ACUITY_SCORE: 28
CONCENTRATING,_REMEMBERING_OR_MAKING_DECISIONS_DIFFICULTY: NO
DRESS: SCRUBS (BEHAVIORAL HEALTH)
ADLS_ACUITY_SCORE: 28
DRESS: INDEPENDENT
ADLS_ACUITY_SCORE: 28
ORAL_HYGIENE: INDEPENDENT
HYGIENE/GROOMING: INDEPENDENT
CHANGE_IN_FUNCTIONAL_STATUS_SINCE_ONSET_OF_CURRENT_ILLNESS/INJURY: NO
WEAR_GLASSES_OR_BLIND: NO
WALKING_OR_CLIMBING_STAIRS_DIFFICULTY: NO
DRESSING/BATHING_DIFFICULTY: NO
ADLS_ACUITY_SCORE: 28
FALL_HISTORY_WITHIN_LAST_SIX_MONTHS: NO
ADLS_ACUITY_SCORE: 28
HYGIENE/GROOMING: INDEPENDENT
ADLS_ACUITY_SCORE: 28
TOILETING_ISSUES: NO
ADLS_ACUITY_SCORE: 28
ADLS_ACUITY_SCORE: 45
ADLS_ACUITY_SCORE: 28
DIFFICULTY_EATING/SWALLOWING: NO
ADLS_ACUITY_SCORE: 28
DOING_ERRANDS_INDEPENDENTLY_DIFFICULTY: NO
ORAL_HYGIENE: INDEPENDENT
LAUNDRY: UNABLE TO COMPLETE
ADLS_ACUITY_SCORE: 28

## 2022-08-24 NOTE — PLAN OF CARE
Face to face shift report received from Erma WHELAN RN. Rounding completed, pt observed.    Problem: Behavioral Health Plan of Care  Goal: Patient-Specific Goal (Individualization)  Description: Patient will attend 50% of groups when able.  Patient will sleep 6-8 hours per night.  Patient will eat 75% of meals  Patient will be cooperative with treatment team recommendations  Patient will maintain appropriate behavior when on the unit.  Patient will remain independent with ADLs.  Outcome: Ongoing, Progressing  Note: Shift Summary:  [facility] at the start of this shift.  Patient is restless.  Eyes dart around room from the internal stimulation she is experiencing.  Admits to feeling afraid but does also state that she feels safe here.  First dose of Metformin administered before evening meal.  Teaching sheet also given and questions encouraged.  No aggression.  Gait is steady.     Problem: Thought Process Alteration  Goal: Optimal Thought Clarity  Description: Patient will contract for safety.  Patient will report decrease in audio/visual hallucinations.  Patient will report linear thoughts prior to discharge  Patient will make needs known  Patient will exhibit decrease in paranoid thinking and behavior by discharge.     Outcome: Ongoing, Progressing  Face to face report will be communicated to oncoming RN.    Clarice Parker RN  8/24/2022

## 2022-08-24 NOTE — LETTER
[facility]  [address] [place] [address]  Phone: [phone]  Fax: [phone]    08/29/22    Marely Welchler  [address] [place] [address] [address] [place] [id number]      To whom it may concern:       Marely has been in my care in the hospital since 8/23/22. Her discharge date is still pending. Please excuse her absence from work.               Sincerely,    Chantel Boss NP

## 2022-08-24 NOTE — PROGRESS NOTES
08/24/22 0347   Patient Belongings   Did you bring any home meds/supplements to the hospital?  Yes   Patient Belongings none;remains with patient;locker;sent to security per site process;other (see comments)  (Blue new bound shoes, brown t-shirt, relativity jeans, mint green long sleeve shirt, pink and black hair tie, blue/green marble shorts and top, black hoodie with design, 5 pairs of underwear, 3 bras, pair of light washed silver jeans, blue t-shirt.)   Patient Belongings Remaining with Patient shoes   Patient Belongings Put in Hospital Secure Location (Security or Locker, etc.) clothing;keys;cell phone/electronics;money (see comment)   Belongings Search Yes   Clothing Search Yes   Second Staff Rosalina RN   Comment Blue new bound shoes, brown t-shirt, relativity jeans, mint green long sleeve top, black t-shirt, pink hair tie, blue/green marble shorts and top, black hoodie with design, 5 pairs of underwear, 3 bras, pair of light washed silver jeans, blue t-shirt, stripped green shorts, blue leggings, black hair tie, black pair of socks under armour bag, subway hat,apron, black pair of socks,underarmour bag,black small back pack, chapstick, little white love coin bag, [place] bank pen, name badge, [place] bank slip, recipt phone , hair brush. $3.81 in change\ and pink slippers.   List items sent to safe: Black lanyard with 2 gold keys, anthony gold ring, $55 (strait talk card) unscratched, [place] identification card, reservation card, [employer] bank card(master card), 5-$20 bills 3-$1 total $103, [employer] phone with no cracks in the screen.   All other belongings put in assigned cubby in belongings room.     I have reviewed my belongings list on admission and verify that it is correct.     Patient signature_______________________________    Second staff witness (if patient unable to sign) ______________________________       I have received all my belongings at discharge.    Patient  signature________________________________    Malone   8/24/2022  4:09 AM

## 2022-08-24 NOTE — CARE PLAN
Prior to Admission Medication Reconciliation:     Medications added:   [] None  [x] As listed below:    Depo provera    Melatonin- pt reports she takes 20-30 mg most nights for sleep    Quetiapine 100 mg    Medications deleted:   [] None  [x] As listed below:    Amitriptyline- discontinued at [place], pt reports it was stopped because she had a heart attack    Outdated meds (gabapentin, hydroxyzine, prazosin)    Medications marked for review/removal by attending:  [x] None  [] As listed below:    Changes made to existing medications:   [x] None  [] Updated strengths and frequencies to most current.   [] As listed below:    Pertinent notes/medications patient takes different than prescribed:     none    Last times/dates taken verified with patient:  [x] Yes- completed myself  [] Prepared PTA medlist for review only. (will not be available to review personally)  [] Did not review with patient. Rx verification only. Review completed by nursing.    [] Nurse completed no changes made (double checked entries)  [] Unable to review with patient at this time:    Allergies listed at another location:  [x]Allergies match allergies listed in Epic  []Other allergies listed:    Allergy review:    []Did not review: reviewed by nursing  []Did not review: pt unable at this time  [x]Verified current existing allergies or NKDA: no changes made   []Patient confirmed current existing allergies and new allergies added:    Medication reconciliation sources:   [x]Patient  []Patient family member/emergency contact: **  [][facility] Report Review  []Epic Chart Review  [x]Care Everywhere review: [place]  Medication Sig Dispensed Refills Start Date End Date Status   melatonin 10 mg tablet   Take 10 mg by mouth   0 01/02/2022   Active   QUEtiapine (SEROQUEL) 100 mg tablet    Indications: Schizoaffective disorder, depressive type (HCC), Insomnia, unspecified type Take 1 tablet (100 mg) by mouth every night at bedtime 90 tablet   3 07/07/2022  2023 Active   amitriptyline (ELAVIL) 25 mg tablet    Indications: Schizoaffective disorder, depressive type (HCC) Take 1 tablet (25 mg) by mouth every night at bedtime 30 tablet   1 05/10/2022 2022 Discontinued   aspirin 81 mg chewable tablet    Indications: Non-ST elevation MI (NSTEMI) (HCC) Take 1 tablet (81 mg) by mouth 1 time per day 30 tablet   0 2022      Medications  Hospital, Clinic, or Other Facility Administered Medication Ordered Dose Route Frequency Start Date End Date Status   medroxyPROGESTERone acetate (DEPO-PROVERA CONTRACEPTIVE) intramuscular suspension 150 mg   150 mg IM Every three months 2022 Active       [x]Pharmacy med list/phone call: [facility]    quetiapine 50 mg BID 22 30 ds    Quetiapine 100 mg at bedtime- sent to pharmacy 22- never picked up    amitriptyline 25 mg at bedtime 22   []Outside meds dispense report: see below  []Nursing home or Assisted Living MAR:  []Other: **    Pharmacy desired at discharge: [facility]    Is patient on coumadin?   [x]No  []Yes    Requests for consultation by provider or pharmacist:   [x] Patient understands why all of their meds were prescribed and how to take them. No questions.   [] Managing party has no questions.   [] Patient/ managing party has questions about the following:  [] Did not review with patient. Cannot assess.     Fill dates and reported compliancy:  [x] Fill dates coincide with reported compliancy for all/most maintenance meds.   [] Not applicable. Patient is not taking any maintenance medications at this time.   [] Fill dates do not coincide with compliancy with maintenance meds. See notes in PTA medlist and in comments.    [] Fill dates do not coincide with the following medications but pt reports compliancy:  [] Did not review with patient. Cannot assess.     Historian accuracy:  [] Excellent- alert and oriented, understands why meds were prescribed and how to take, able to  answer specifics  [x] Good- alert and oriented, understands why meds were prescribed and how to take, some confusion   [] Fair- alert and oriented, doesn't know medications without list, cannot answer specifics about medications, but has a decent process for which to take at home  [] Poor- does not know medications, may not have a process to take at home, may be cognitively unable to review at this time  []Medication management done by family member or facility, no concerns about historian accuracy.   [] Did not review with patient. Cannot assess.     Medication Management:  [x] Manages meds independently  [] Family member/ other party manages meds/assists:  [] Meds managed by staff at facility  [] Meds set up by home care, family/other party helps administer  [] Meds set up by home care, self administers  [] Did not review with patient. Cannot assess.     Other medications aside from PTA:  [x] Denies taking any medications aside from those listed in PTA meds  [] Reports taking another medication(s) but cannot recall the name(s)  [] Refuses to say.  [] Did not review with patient. Cannot assess.     Comments: none      Carole Andrew on 8/24/2022 at 10:17 AM       Notifying appropriate party of changes/additions/discrepancies:  []No pertinent changes made, notification not necessary.   [] Notified attending provider via text page/phone call  [] Notified attending provider in person  [] Notified pharmacy  [] Notified nurse  [x] Medications have not been reconciled by a provider yet, notification not necessary  [] Pt is not admitted to floor yet, PTA meds completed before admission.   Medications Prior to Admission   Medication Sig Dispense Refill Last Dose     medroxyPROGESTERone (DEPO-PROVERA) 150 MG/ML syringe Inject 150 mg into the muscle every 3 months   7/6/2022     Melatonin 10 MG TABS tablet Take 20-30 mg by mouth nightly as needed   8/23/2022 at 2314 ED     QUEtiapine (SEROQUEL) 100 MG tablet Take 100 mg by  mouth At Bedtime   8/23/2022 at 2314 ED

## 2022-08-24 NOTE — H&P
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 She reports she does have a current outbreak. Pt has roommate, discussed with nursing and pt will be moved to private room. Valtrex 1000 mg po every day for 5 day ordered.       Fibromyalgia- reports she has generalized pain at times and takes tylenol as needed. This is available prn.    Flatulence- pt requests \"something for gas\". Maalox is available prn and ordered.     Pt report she had a heart attack a couple months ago. In care everywhere she had a followup hospitalization from [facility]. She was discharged on 6/27/2022. Per follow up note 7/7/22- \"She has a past history of methamphetamine abuse, schizophrenia, presented with hypotension, lower systolic pressures of 64. She apparently was found by law enforcement outside incoherent in 100 degree weather, was hyperthermic and hypotensive. Unable to protect airway and was intubated and then later on 6/21 extubated. There was concern of possible non-STEMI, was found to be a type 2. Had echocardiogram showing hypokinetic segments of the inferior wall. Ejection fraction 50%. She had PET stress test completed without any abnormalities. Was started on aspirin. She did have rhabdomyolysis, elevated CK secondary to ischemia and severe hyperthermia. Was treated with fluids for NANCY and dehydration.\"   CMP reviewed- kidney and liver function wnl. Pt denies chest pain, sob.    Hypokalemia- potassium yesterday low at 3.3. Unsure if this was replaced. Check potassium today and wnl at 4.0.     Pt medically stable, no acute medical concerns. Chronic medical problems stable. Will sign off. Please consult for any " new medical issues or concerns.       Code Status: Full Code    Yessica Lencho, CNP    Primary Care Physician   Physician No Ref-Primary    Chief Complaint   Psych evaluation     History is obtained from the patient and medical     History of Present Illness   (per intake) Marely Seo is a 42 year old female who presents to [facility] ED for psychosis.  Hx of schizophrenia.  Pt reports AH telling her they are going to harm her children and grandchildren.  Pt reports olfactory hallucinations and VH.  Pt also reports some paranoia.  No reported medical concerns.  Utox neg.  Covid neg.  No reported medical concerns.  Potassium is low at 3.3.     Past Medical History    I have reviewed this patient's medical history and updated it with pertinent information if needed.   Past Medical History:   Diagnosis Date     Irritable bowel syndrome      Lactose intolerance      NO ACTIVE PROBLEMS        Past Surgical History   I have reviewed this patient's surgical history and updated it with pertinent information if needed.  Past Surgical History:   Procedure Laterality Date     APPENDECTOMY       HC BREATH HYDROGEN TEST  12/19/2011    Procedure:HYDROGEN BREATH TEST; Surgeon:MIC ALMANZA; Location:[facility]       Prior to Admission Medications   Prior to Admission Medications   Prescriptions Last Dose Informant Patient Reported? Taking?   Melatonin 10 MG TABS tablet 8/23/2022 at 2314 ED  Yes Yes   Sig: Take 20-30 mg by mouth nightly as needed   QUEtiapine (SEROQUEL) 100 MG tablet 8/23/2022 at 2314 ED  Yes Yes   Sig: Take 100 mg by mouth At Bedtime   medroxyPROGESTERone (DEPO-PROVERA) 150 MG/ML syringe 7/6/2022  Yes Yes   Sig: Inject 150 mg into the muscle every 3 months      Facility-Administered Medications: None     Allergies   No Known Allergies    Social History   I have reviewed this patient's social history and updated it with pertinent information if needed. Marely Seo  reports that she quit smoking  about 11 years ago. Her smoking use included cigarettes. She has never used smokeless tobacco. She reports that she does not drink alcohol and does not use drugs.    Family History   I have reviewed this patient's family history and updated it with pertinent information if needed.   Family History   Problem Relation Age of Onset     Heart Disease Father        Review of Systems   CONSTITUTIONAL:  negative  EYES:  negative  HEENT:  negative  RESPIRATORY:  negative  CARDIOVASCULAR:  negative  GASTROINTESTINAL:  Negative except gas   GENITOURINARY:  negative  INTEGUMENT/BREAST:  negative  HEMATOLOGIC/LYMPHATIC:  negative  ALLERGIC/IMMUNOLOGIC:  negative  ENDOCRINE:  negative  MUSCULOSKELETAL:  negative  NEUROLOGICAL:  negative    Physical Exam   Temp: 100  F (37.8  C) Temp src: Tympanic BP: 113/69 Pulse: 88   Resp: 12 SpO2: 97 % O2 Device: None (Room air)    Vital Signs with Ranges  Temp:  [97.8  F (36.6  C)-100  F (37.8  C)] 100  F (37.8  C)  Pulse:  [] 88  Resp:  [12-18] 12  BP: (113-147)/(69-87) 113/69  SpO2:  [97 %] 97 %  0 lbs 0 oz    Constitutional: awake, alert, cooperative, no apparent distress, and appears stated age, vitals stable   Eyes: Lids and lashes normal, pupils equal, round and reactive to light, extra ocular muscles intact, sclera clear, conjunctiva normal  ENT: Normocephalic, without obvious abnormality, atraumatic, external ears without lesions, oral pharynx with moist mucous membranes, no erythema or exudates  Hematologic / Lymphatic: no cervical lymphadenopathy  Respiratory: No increased work of breathing, good air exchange, clear to auscultation bilaterally, no crackles or wheezing  Cardiovascular: Normal apical impulse, regular rate and rhythm, normal S1 and S2, no S3 or S4, and no murmur noted  GI: normal bowel sounds, soft, non-distended, non-tender, no masses palpated, no hepatosplenomegally  Genitounirinary: deferred  Skin: normal skin color, texture, turgor and no redness, warmth, or  swelling  Musculoskeletal: There is no redness, warmth, or swelling of the joints.  Full range of motion noted.    Neurologic: Awake, alert, oriented to name, place and time.   Neuropsychiatric: General: guarded and normal eye contact    Data   Data reviewed today:   Recent Labs   Lab 08/24/22  1034   POTASSIUM 4.0       No results found for this or any previous visit (from the past 24 hour(s)).

## 2022-08-24 NOTE — PLAN OF CARE
"Problem: Thought Process Alteration  Goal: Optimal Thought Clarity  Note: Patient will contract for safety.  Patient will report decrease in audio/visual hallucinations.  Patient will report linear thoughts prior to discharge  Patient will make needs known  Patient will exhibit decrease in paranoid thinking and behavior by discharge.    Problem: Behavioral Health Plan of Care  Goal: Patient-Specific Goal (Individualization)  Note: Patient will attend 50% of groups when able.  Patient will sleep 6-8 hours per night.  Patient will eat 75% of meals  Patient will be cooperative with treatment team recommendations  Patient will maintain appropriate behavior when on the unit.  Patient will remain independent with ADLs.     ADMISSION NOTE    Reason for admission command auditory hallucinations.  No Safety concerns noted.  No Risk for or history of violence noted.   Full skin assessment: done upon admission with no areas of concern    Patient arrived on unit from [facility]  accompanied by ambulance crew on 8/24/2022  0208 AM.   Status on arrival: calm & cooperative  /87 (BP Location: Left arm)   Pulse (!) 134   Temp 97.8  F (36.6  C) (Temporal)   Resp 18   Ht 1.753 m (5' 9.02\")   SpO2 97%   BMI 28.37 kg/m       Patient reported hearing voices telling her to kill her children & grandchildren.  Patient also notes the voices were \"ruining her food & hair\".  She also reports smells of cat & dog; neither of which she owns, which makes it difficult to remain in her home.    Patient reports seeing things in the trees but she doesn't know what she was seeing.     Patient given tour of unit and Welcome to  unit papers given to patient, wanding completed, belongings inventoried, and admission assessment completed.     Patient's legal status on arrival is voluntary. Appropriate legal rights discussed with and copy given to patient. Patient Bill of Rights discussed with and copy given to patient. Full " skin assessment performed with nothing abnormal/of interest to note.  Patient denies SI, HI, and thoughts of self harm and contracts for safety while on unit.      Rosalina Murcia RN  8/24/2022  2:08 AM

## 2022-08-24 NOTE — H&P
"[facility] PSYCHIATRY   HISTORY AND PHYSICAL     ADMISSION DATA     Artie Seo MRN# [id number]   Age: 42 year old YOB: 1980     Date of Admission: 8/24/2022  Primary Physician: No Ref-Primary, Physician        CHIEF COMPLAINT   \"Do you hear the voices.\"       HISTORY OF PRESENT ILLNESS     artie brought herself to the ER with increased auditory hallucinations. ER notes indicte that she stated the voices were telling her to kill herself and her grandchildren though she tells me that the voices are telling her that they are going to kill her family. She states \"they say if I use my heating pad they will kill my grandkids\" she begins to cry when she tells me this. She states she has been hearing voices for a few years and asks me if I can hear them. I told her I could not and that the voices were directly caused by her mental illness and that we can help with this by either increasing seroquel or changing to another medication to target the voices. luisa appears to understand that the voices arent real though needs reassurance. She becomes tearful when she tells me the voices tell her that they are going to kill her family. She is very distressed by them. She states she likes the seroquel because it helps her sleep though she feels veyr tired from it the next day. She states \"I dont think I could go up much more than 150 mg wtihout feeling too tired\". She agreed to try invega though would like to stay on seoruqel until her sleep improves. Amitriptyline was recently started then provider stopped it after learning about her recent cardiac event.          PSYCHIATRIC HISTORY     She was recently hospitalized at [facility]. Was discharged on 6/27/22 at that time she was found by law enforcement. she was found to be very disorganized walking around in 100 degree weather with NANCY and likely rhabdomylosis. It was found she had type 2 non stemi event.     She has had a total of four " hospitalization. I do not see she has ever been under commitment.    She was suppose ot have a meeting with a new [facility] worker next week. She does see a psychiatrist/NP at  [facility].      SUBSTANCE USE HISTORY     States she has not used methamphetamine since her cardiac event which was in June.            SOCIAL HISTORY     She currently lives in [place] by herself. She has an adult daughter that lives very close. She has 4 grandchildren and one on the way expected in 2 months.        FAMILY HISTORY   Family History   Problem Relation Age of Onset     Heart Disease Father                PAST MEDICAL HISTORY   Past Medical History:   Diagnosis Date     Irritable bowel syndrome      Lactose intolerance      NO ACTIVE PROBLEMS        Past Surgical History:   Procedure Laterality Date     APPENDECTOMY       HC BREATH HYDROGEN TEST  12/19/2011    Procedure:HYDROGEN BREATH TEST; Surgeon:MIC ALMANZA; Location: GI       Patient has no known allergies.     MEDICATIONS   Prior to Admission medications    Medication Sig Start Date End Date Taking? Authorizing Provider   gabapentin (NEURONTIN) 100 MG capsule Take 1 capsule (100 mg) by mouth 3 times daily 5/15/17   Amelia Fam NP   hydrOXYzine (VISTARIL) 50 MG capsule Take 1 capsule (50 mg) by mouth 2 times daily as needed for anxiety 5/15/17   Amelia Fam NP   prazosin (MINIPRESS) 1 MG capsule Take 1 capsule (1 mg) by mouth At Bedtime 5/15/17   Amelia Fam NP        PHYSICAL EXAM/ROS     I have reviewed the physical exam as documented by the medical team and agree with findings and assessment and have no additional findings to add at this time. The review of systems is negative other than noted in the HPI.       LABS   Recent Results (from the past 24 hour(s))   INFLUENZA A/B & COVID-19 PCR (EXTERNAL RESULT)    Collection Time: 08/23/22  9:33 AM   Result Value Ref Range    COVID-19 Virus by PCR (External Result) Not Detected Not Detected      "    MENTAL STATUS EXAM   Vitals: /69   Pulse 88   Temp 100  F (37.8  C) (Tympanic)   Resp 12   Ht 1.753 m (5' 9.02\")   SpO2 97%   BMI 28.37 kg/m      MSE/PSYCH  PSYCHIATRIC EXAM  /69   Pulse 88   Temp 100  F (37.8  C) (Tympanic)   Resp 12   Ht 1.753 m (5' 9.02\")   SpO2 97%   BMI 28.37 kg/m    -Appearance/Behavior: disorganized disheveled  -Motor: intact  -Gait: intact  -Abnormal involuntary movements: none  -Mood: fearful, tense  -Affect: restritcted  -Speech: monotone. Not pressurred  -Thought process/associations: mild thought blocking. Distracted with voices.   -Thought content:  Has AH. Denies that they are telling her to kill family. States \"they say they will kill my family\"  -Perceptual disturbances: preoccupeid          -Suicidal/Homicidal Ideation: when asked about suicidal thoughts she began to cry though denied that she would harm herlsef though stated \"it gets hard\"   -Judgment:limited  -Insight: poor  *Orientation: time, place and person.  *Memory: difficult to assess due to dmitriy  *Attention: poor  *Language: fluent, no aphasias, able to repeat phrases and name objects. Vocab intact.  *Fund of information: difficult to assess due to psychosis  *Cognitive functioning estimate: 0 - independent.         ASSESSMENT     This is a 42 year old female with a PMH of schizophrenia and meth use. She has not used methamphetamine in over 2 months and the auditory hallucinations continue. They are telling her that they will kill her family if she does specific things like \"use my heating pad   seh does have insight though needs significant amount of reassurance that the voices are related to mental illness and that medication adjustments will likely help decrease them significantly.        DIAGNOSIS     1.schizophrenia  2.methamphetmaine use disorder in early partial remission         PLAN     Location: Unit 5  Legal Status: None    Safety Assessment:    Behavioral Orders   Procedures     " Code 1 - Restrict to Unit     Routine Programming     As clinically indicated     Status 15     Every 15 minutes.      PTA medications held:     none    PTA medications continued/changed:     Continue seroquel until invega is more effective    New medications initiated:     Start invega 3 mg and increase as tolerated.     Programming: Patient will be treated in a therapeutic milieu with appropriate individual and group therapies. Education will be provided on diagnoses, medications, and treatments.     Medical diagnoses:  Per medicine    Consult: none  Tests: ekg will be completed     Anticipated LOS: 5 days to stabilize on medications to decrease hallucinations.   Disposition: home with current services.        ATTESTATION      April Nan Boss NP

## 2022-08-24 NOTE — PLAN OF CARE
Problem: Behavioral Health Plan of Care  Goal: Patient-Specific Goal (Individualization)  Description: Patient will attend 50% of groups when able.  Patient will sleep 6-8 hours per night.  Patient will eat 75% of meals  Patient will be cooperative with treatment team recommendations  Patient will maintain appropriate behavior when on the unit.  Patient will remain independent with ADLs.  Outcome: Ongoing, Progressing     Face to face end of shift report received from night shift RN. Rounding completed. Pt observed in their own bed, with eyes closed, in right side lying position, and with rested breathing. Pt up to lobby for breakfast. Pt denies pain. Pt requests and receives hygiene supplies. Pt denies further needs and other discomforts. Pt denies SI and HI. Pt is hesitant in denying hallucinations. Pt returns to bed. Pt up to attend group. Pt pleasant, polite, and calm throughout interactions. REYNALDO Juan, advises moving Pt to a private room after Pt reports active HSV-2 infection; Pt moved to a private room. Order for Valtrex acknowledged, 1322 Pt accepts and receives this shift's scheduled Valtrex. In afternoon, Pt reports auditory hallucinations; Pt reports the hallucinations are commanding her to do things; Pt does not share what the hallucinations are telling her to do; Pt reports she does not intend to act on command hallucinations. Will continue to support the needs of the patient. Face to face end of shift report to be given to evening shift RN.      Problem: Thought Process Alteration  Goal: Optimal Thought Clarity  Description: Patient will contract for safety.  Patient will report decrease in audio/visual hallucinations.  Patient will report linear thoughts prior to discharge  Patient will make needs known  Patient will exhibit decrease in paranoid thinking and behavior by discharge.  Outcome: Ongoing, Progressing

## 2022-08-25 PROCEDURE — 124N000001 HC R&B MH

## 2022-08-25 PROCEDURE — 250N000013 HC RX MED GY IP 250 OP 250 PS 637: Performed by: NURSE PRACTITIONER

## 2022-08-25 PROCEDURE — 99233 SBSQ HOSP IP/OBS HIGH 50: CPT | Performed by: NURSE PRACTITIONER

## 2022-08-25 RX ORDER — LORAZEPAM 1 MG/1
1 TABLET ORAL EVERY 6 HOURS PRN
Status: DISCONTINUED | OUTPATIENT
Start: 2022-08-25 | End: 2022-08-28

## 2022-08-25 RX ORDER — HALOPERIDOL 5 MG/1
5 TABLET ORAL EVERY 6 HOURS PRN
Status: DISCONTINUED | OUTPATIENT
Start: 2022-08-25 | End: 2022-08-28

## 2022-08-25 RX ORDER — PALIPERIDONE 6 MG/1
6 TABLET, EXTENDED RELEASE ORAL AT BEDTIME
Status: DISCONTINUED | OUTPATIENT
Start: 2022-08-25 | End: 2022-08-29

## 2022-08-25 RX ORDER — QUETIAPINE FUMARATE 100 MG/1
100 TABLET, FILM COATED ORAL
Status: DISCONTINUED | OUTPATIENT
Start: 2022-08-25 | End: 2022-08-27

## 2022-08-25 RX ADMIN — LORAZEPAM 1 MG: 1 TABLET ORAL at 20:22

## 2022-08-25 RX ADMIN — METFORMIN HYDROCHLORIDE 500 MG: 500 TABLET, EXTENDED RELEASE ORAL at 16:28

## 2022-08-25 RX ADMIN — OLANZAPINE 10 MG: 10 TABLET ORAL at 09:43

## 2022-08-25 RX ADMIN — HALOPERIDOL 5 MG: 5 TABLET ORAL at 13:41

## 2022-08-25 RX ADMIN — LORAZEPAM 1 MG: 1 TABLET ORAL at 13:41

## 2022-08-25 RX ADMIN — VALACYCLOVIR HYDROCHLORIDE 1000 MG: 500 TABLET, FILM COATED ORAL at 08:16

## 2022-08-25 RX ADMIN — ALUMINUM HYDROXIDE, MAGNESIUM HYDROXIDE, AND SIMETHICONE 30 ML: 200; 200; 20 SUSPENSION ORAL at 12:29

## 2022-08-25 RX ADMIN — MELATONIN 3 MG: 3 TAB ORAL at 20:22

## 2022-08-25 RX ADMIN — PALIPERIDONE 6 MG: 6 TABLET, EXTENDED RELEASE ORAL at 20:22

## 2022-08-25 ASSESSMENT — ACTIVITIES OF DAILY LIVING (ADL)
DRESS: SCRUBS (BEHAVIORAL HEALTH)
ADLS_ACUITY_SCORE: 28
ORAL_HYGIENE: INDEPENDENT
ADLS_ACUITY_SCORE: 28
ADLS_ACUITY_SCORE: 28
LAUNDRY: UNABLE TO COMPLETE
ADLS_ACUITY_SCORE: 28
HYGIENE/GROOMING: INDEPENDENT
HYGIENE/GROOMING: INDEPENDENT
ADLS_ACUITY_SCORE: 28
DRESS: INDEPENDENT
ADLS_ACUITY_SCORE: 28
ADLS_ACUITY_SCORE: 28
ORAL_HYGIENE: INDEPENDENT
ADLS_ACUITY_SCORE: 28

## 2022-08-25 NOTE — PLAN OF CARE
Social Service Psychosocial Assessment  Presenting Problem:  Per notes, pt presented to the [facility] with hallucinations to kill her children and grandchildren.  Marital Status:  Single  Spouse / Children:  None/an adult daughter  Psychiatric TX HX:  Yes- last IP hospitalization at  for 7 days.   Suicide Risk Assessment: Pt denies SI during ED admission. Pt denies a history of SI or SA. Pt denies SI during current assessment.   Access to Lethal Means (explain):  Denies access to guns.   Family Psych HX:  Unknown  A & Ox:  x4  Medication Adherence:  See H&P  Medical Issues:  See H&P  Visual -Motor Functioning:  Good  Communication Skills /Needs:  Good  Ethnicity:   White     Spirituality/Scientology Affiliation:  Muslim     Clergy Request:   No   History:  None  Living Situation:  States to have her own residence in [place] and plans to return here on discharge.   ADL s:  Independent   Education:  Graduated HS  Financial Situation:  Income from employment.   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   Leisure & Recreation:  Time with family   Childhood History:  Unknown   Trauma Abuse HX:  Unknown  Relationship / Sexuality:  Uknown  Substance Use/ Abuse:  Amphetamine, cocoaine,, and methamphetamines abuse-with last self-reported use in March 2021. States to binge on meth 4-5 times a year.  Chemical Dependency Treatment HX:  Yes- teen challenge and    Legal Issues:  Denies   Significant Life Events:  CD history  Strengths:  Has healthy supports, has income  Challenges /Limitation:  Current MH symptoms, family is encouraging IRTS-pt declines  Patient Support Contact (Include name, relationship, number, and summary of conversation):   Pt signed an RANULFO for daughter- Fariba [phone], and Shyla [id number]-[phone]  Interventions:        Vulnerable Adult/Child Report- None    Community-Based Programs- Establish care appointment for [facility]  services at [facility].     Medical/Dental Care-PCP-None    Home Care- None    CD Evaluation/Rule 25/Aftercare- Denies abuse    Medication Management- [facility]- Oksana Estrada- appointment on August 29th    Individual Therapy- None    Clergy Request- None    Housing/Placement- States to reside in [place] and plans to return there on discharge.     Case Management- None    Insurance Coverage- [facility]    Financial Assistance- None    Commit/Love Screening- None    Suicide Risk Assessment-Pt denies SI during ED admission. Pt denies a history of SI or SA. Pt denies SI during current assessment.     High Risk Safety Plan- Talk to supports; Call crisis lines; Go to local ER if feeling suicidal.  ROXANNE Guerin  8/25/2022  11:28 AM

## 2022-08-25 NOTE — DISCHARGE INSTRUCTIONS
Behavioral Discharge Planning and Instructions    Summary: You were admitted on 8/24/2022  due to Disorganized Thinking/Behaviors and Delusional Thoughts.  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  [place], [place] [address]  Phone: [phone]  Fax:[phone]    24-HOUR MOBILE CRISIS LINE- [phone]    Attend all scheduled appointments with your outpatient providers. Call at least 24 hours in advance if you need to reschedule an appointment to ensure continued access to your outpatient providers.     Major Treatments, Procedures and Findings:  You were provided with: a psychiatric assessment, assessed for medical stability, medication evaluation and/or management, and group therapy    Symptoms to Report: feeling more aggressive, increased confusion, losing more sleep, mood getting worse, or thoughts of suicide    Early warning signs can include: increased depression or anxiety sleep disturbances increased thoughts or behaviors of suicide or self-harm  increased unusual thinking, such as paranoia or hearing voices    Safety and Wellness:  Take all medicines as directed.  Make no changes unless your doctor suggests them.      Follow treatment recommendations.  Refrain from alcohol and non-prescribed drugs.  Ask your support system to help you reduce your access to items that could harm yourself or others. If there is a concern for safety, call 911.    Resources:   Crisis Intervention: 564.391.5103 or 684-802-1167 (TTY: 983.268.7850).  Call anytime for help.  National [place] on Mental Illness (www.[place].katey.org): 384.255.3609 or 979-830-5826.  [place] Association for  "Children's Mental Health (www.macmh.org): 892.919.7521.  Alcoholics Anonymous (www.alcoholics-anonymous.org): Check your phone book for your local chapter.  Suicide Awareness Voices of Education (SAVE) (www.save.org): 608-582-JBKX (1328)  National Suicide Prevention Line (www.mentalhealthmn.org): 317-534-WILT (6103)  Mental Health Consumer/Survivor Network of [place] (www.mhcsn.net): 229.885.7996 or 000-406-0816  Mental Health Association of [place] (www.mentalhealth.org): 106.699.4671 or 708-026-5418  Self- Management and Recovery Training., [employer]-- Toll free: 114.629.5193  avVenta.[place]  Text 4 Life: txt \"LIFE\" to 81032 for immediate support and crisis intervention  Crisis text line: Text \"[place]\" to 679266. Free, confidential, 24/7.  Crisis Intervention: 892.569.5109 or 216-308-3087. Call anytime for help.   [place] Area:  [facility], Crisis stabilization [facility]- 836.608.1286  [facility] Crisis Line: 1-334.263.7907  Advocates For Family Peace: 806-1589  Sexual Assault Program [facility]: 864.776.8979 or 1-871.119.6223  [facility] Battered Women's Program: 1-968.875.8922 Ext: 279       Calls answered Mon-Fri-8:00 am--4:30 pm    [place]:  Advocates for Family Peace: 4-800-686-5225  [facility] - 9-627-159-4705  [facility] first call for help: 7-518-159-5506  [facility]:  (162) 766-7631    [place] Area:  Warm Line: 1-999.427.1509       Calls answered Tuesday--Saturday 4:00 pm--10:00 pm  [facility] Crisis Line - 549.438.9417  [facility] 855-427-3148    [place] Statewide:  [place] Crisis and Referral Services: 1-371.409.1656  National Suicide Prevention Lifeline: 8-563-599-TALK (4842)   - ais8tigy- Text \"Life\" to 69987  First Call for Help: 2-1-1  ROSITA Helpline- 9-242-CRLM-HELP   Crisis Text Line: Text  [place]  to 937279     General Medication Instructions:   See your medication sheet(s) for instructions.   Take all medicines as directed.  Make no changes unless " your doctor suggests them.   Go to all your doctor visits.  Be sure to have all your required lab tests. This way, your medicines can be refilled on time.  Do not use any drugs not prescribed by your doctor.  Avoid alcohol.    Advance Directives:   Scanned document on file with [place]? No scanned doc  Is document scanned? Pt states no documents  Honoring Choices Your Rights Handout: Informed and given  Was more information offered? Pt declined    The Treatment team has appreciated the opportunity to work with you. If you have any questions or concerns about your recent admission, you can contact the unit which can receive your call 24 hours a day, 7 days a week. They will be able to get in touch with a Provider if needed. The unit number is [phone] .

## 2022-08-25 NOTE — PLAN OF CARE
Problem: Suicidal Behavior  Goal: Suicidal Behavior is Absent or Managed  Outcome: Ongoing, Progressing     Problem: Thought Process Alteration  Goal: Optimal Thought Clarity  Description: Patient will contract for safety.  Patient will report decrease in audio/visual hallucinations.  Patient will report linear thoughts prior to discharge  Patient will make needs known  Patient will exhibit decrease in paranoid thinking and behavior by discharge.   Outcome: Ongoing, Progressing     Problem: Behavioral Health Plan of Care  Goal: Patient-Specific Goal (Individualization)  Description: Patient will attend 50% of groups when able.  Patient will sleep 6-8 hours per night.  Patient will eat 75% of meals  Patient will be cooperative with treatment team recommendations  Patient will maintain appropriate behavior when on the unit.  Patient will remain independent with ADLs.  Outcome: Ongoing, Progressing         Face to Face report received from Clarice PABLO RN.  Rounding completed. Patient observed in her room with eyes closed appearing to sleep for 6 hours; normal respirations noted on 15 minute safety checks.  Will continue to monitor.  Face to face end of shift report communicated to [facility] day shift RN.     Rosalina Murcia RN  8/24/2022  11:52 PM

## 2022-08-25 NOTE — PLAN OF CARE
Face to face shift report received from Erma WHELAN RN. Rounding completed, pt observed.    Problem: Behavioral Health Plan of Care  Goal: Patient-Specific Goal (Individualization)  Description: Patient will attend 50% of groups when able.  Patient will sleep 6-8 hours per night.  Patient will eat 75% of meals  Patient will be cooperative with treatment team recommendations  Patient will maintain appropriate behavior when on the unit.  Patient will remain independent with ADLs.  Outcome: Ongoing, Progressing  Note: Shift Summary:  Patient up playing cards in lounge with p[eer at the start of this shift.  Patient admits to continued hallucinations but does state that they may be somewhat less intense. Compliant with medications.  Denies pain.  Gait is balanced and steady.     Problem: Thought Process Alteration  Goal: Optimal Thought Clarity  Description: Patient will contract for safety.  Patient will report decrease in audio/visual hallucinations.  Patient will report linear thoughts prior to discharge  Patient will make needs known  Patient will exhibit decrease in paranoid thinking and behavior by discharge.     Outcome: Ongoing, Progressing     Problem: Suicidal Behavior  Goal: Suicidal Behavior is Absent or Managed  Outcome: Ongoing, Progressing  Face to face report will be communicated to oncoming RN.    Clarice Parker RN  8/25/2022

## 2022-08-25 NOTE — PLAN OF CARE
"  Problem: Behavioral Health Plan of Care  Goal: Patient-Specific Goal (Individualization)  Description: Patient will attend 50% of groups when able.  Patient will sleep 6-8 hours per night.  Patient will eat 75% of meals  Patient will be cooperative with treatment team recommendations  Patient will maintain appropriate behavior when on the unit.  Patient will remain independent with ADLs.  Outcome: Ongoing, Progressing    Face to face end of shift report received from night shift RN. Rounding completed. Pt observed in their own bed, with eyes closed, in right side-lying position, and with rested breathing. Pt up to lobby for breakfast. Pt denies pain, needs, and other discomforts. Pt denies SI, HI, and hallucinations. Pt accepts and receives this shift's scheduled medication. Pt reports wanting to discharge this weekend; Pt reports she wants to go back to work on Monday; Pt expresses concerns regarding transportation; Therapeutic communication utilized. Pt calm after interaction; Pt returns to bed. Pt up to watch television in the lobby. Pt tense and reports anxiety. 0943 Pt requests and receives Zyprexa prn as ordered. Pt observed playing cards with a peer in the lobby. After lunch, Pt approaches nurse's station panicking; She reports her pop was poisoned; She repeatedly requests Metformin; She states, \"I need Metformin. I need something to wash out the poison.\" Therapeutic communication utilized. Pt reports she believes her food is being \"zapped\" in a way that poisons the food. Pt reports feeling discomfort in her abdomen; 1229 Maalox offered to and accepted by Pt prn as ordered. April NP notified of pt's current state; April NP reports they will order Haldol 5 mg and Ativan 1 mg prn; Order acknowledged. 1341 Haldol and Ativan offered to and accepted by Pt prn as ordered. Pt rests in bed. Will continue to support the needs of the patient. Face to face end of shift report to be given to evening shift RN.     "   Problem: Thought Process Alteration  Goal: Optimal Thought Clarity  Description: Patient will contract for safety.  Patient will report decrease in audio/visual hallucinations.  Patient will report linear thoughts prior to discharge  Patient will make needs known  Patient will exhibit decrease in paranoid thinking and behavior by discharge.  Outcome: Ongoing, Progressing     Problem: Suicidal Behavior  Goal: Suicidal Behavior is Absent or Managed  Outcome: Ongoing, Progressing -- Pt remained free of self injury and harm throughout the duration of this shift.

## 2022-08-25 NOTE — PROGRESS NOTES
"[facility] PSYCHIATRY  PROGRESS NOTE     SUBJECTIVE     Her first dose of Invega and metformin last night.  It appears she did sleep 6 hours.  She is still on scheduled Seroquel 100 mg for sleep.  Her speech is still monotone and a bit delayed and she appears to respond to stimuli and acknowledges that she still hears voices telling her her family. She states that the hallucinations are no better. She states \"they are saying really mean things still\". Her eye contact is limited. She does tell me that \"somehting was wrong with her pop that she got on her tray.she has no insight that this is a delusion. She did tell staff she thought someone may have put something in her pop. She did eat. luisa has been going to groups. luisa states she slept hard last night. No noted side effects. Possibly some akathisia. She wrings her hands repetatively and appears mildly uncomfortable though denies that she feels restless. She does endorse anxiety. She states she does work Monday though may be willing to stay longer and would like to stay until she improved.         MEDICATIONS   Scheduled Meds:    metFORMIN  500 mg Oral Daily with supper     paliperidone  3 mg Oral At Bedtime     QUEtiapine  100 mg Oral At Bedtime     valACYclovir  1,000 mg Oral Daily     PRN Meds:.acetaminophen, alum & mag hydroxide-simethicone, hydrOXYzine, melatonin, OLANZapine **OR** OLANZapine, senna-docusate, simethicone     ALLERGIES   No Known Allergies     MENTAL STATUS EXAM   Vitals: /80   Pulse 104   Temp 97.9  F (36.6  C) (Temporal)   Resp 16   Ht 1.753 m (5' 9.02\")   SpO2 98%   BMI 28.37 kg/m      Appearance: Alert, oriented, dressed in hospital scrubs  Attitude: Cooperative   Eye Contact: Fair  Mood: \"Depressed\" somewhat anxious  Affect: Restricted range of affect, mood congruent  Speech: Slight reduction in rate. Normal rhythm   Psychomotor Behavior: No tremor, rigidity, akathisia, or psychomotor retardation    Thought Process: Quite " "preoccupied with hallucinations  Associations: No loose associations   Thought Content: Paranoid delusions continue auditory hallucinations continue   Insight: Fair   Judgment: Fair  Oriented to: Person, place, and time  Attention Span and Concentration: Intact  Recent and Remote Memory: Intact  Language: English with appropriate syntax and vocabulary  Fund of Knowledge: Average  Muscle Strength and Tone: Grossly normal  Gait and Station: Grossly normal       LABS   Recent Results (from the past 24 hour(s))   Potassium    Collection Time: 08/24/22 10:34 AM   Result Value Ref Range    Potassium 4.0 3.4 - 5.3 mmol/L   Renal panel    Collection Time: 08/24/22 10:34 AM   Result Value Ref Range    Sodium 139 133 - 144 mmol/L    Potassium 4.0 3.4 - 5.3 mmol/L    Chloride 111 (H) 94 - 109 mmol/L    Carbon Dioxide (CO2) 23 20 - 32 mmol/L    Anion Gap 5 3 - 14 mmol/L    Urea Nitrogen 9 7 - 30 mg/dL    Creatinine 0.80 0.52 - 1.04 mg/dL    Calcium 9.2 8.5 - 10.1 mg/dL    Glucose 116 (H) 70 - 99 mg/dL    Albumin 3.5 3.4 - 5.0 g/dL    Phosphorus 3.5 2.5 - 4.5 mg/dL    GFR Estimate >90 >60 mL/min/1.73m2         IMPRESSION           This is a 42 year old female with a PMH of schizophrenia and meth use. She has not used methamphetamine in over 2 months and the auditory hallucinations continue. They are telling her that they will kill her family if she does specific things like \"use my heating pad   seh does have insight though needs significant amount of reassurance that the voices are related to mental illness and that medication adjustments will likely help decrease them significantly.             DIAGNOSES     Schizophrenia  Methamphetamine use disorder in partial early remission.       PLAN     Location: Unit 5  Legal Status: None    Safety Assessment:    Behavioral Orders   Procedures     Code 1 - Restrict to Unit     Routine Programming     As clinically indicated     Status 15     Every 15 minutes.      PTA medications " continued/changed:     None    New medications tried and stopped:     -None    New medications initiated:     Invega was started.  Seroquel 100 mg was continued until Invega is more effective    Today's Changes:    Increase Invega to 6 mg  Change seroquel to 100 mg prn as needed.     Programming: Patient will be treated in a therapeutic milieu with appropriate individual and group therapies. Education will be provided on diagnoses, medications, and treatments.     Medical diagnoses:  Per medicine    Consult: None    Labs none needed at this time    Anticipated LOS: 3 to 5 days to stabilize on medications  Disposition: Home with services       TREATMENT TEAM CARE PLAN     Progress: Continued symptoms.  Continue to increase Invega    Continued Stay Criteria/Rationale: Continued symptoms without sufficient improvement/resolution.    Medical/Physical: See above.    Precautions: See above.     Plan: Continue inpatient care with unit support and medication management.    Rationale for change in precautions or plan: NA due to no change.    Participants: Chantel Boss NP, Nursing, SW, OT.    The patient's care was discussed with the treatment team and chart notes were reviewed.       ATTESTATION    Chantel Boss NP

## 2022-08-26 PROCEDURE — 124N000001 HC R&B MH

## 2022-08-26 PROCEDURE — 250N000013 HC RX MED GY IP 250 OP 250 PS 637: Performed by: NURSE PRACTITIONER

## 2022-08-26 PROCEDURE — 93005 ELECTROCARDIOGRAM TRACING: CPT

## 2022-08-26 PROCEDURE — 99232 SBSQ HOSP IP/OBS MODERATE 35: CPT | Performed by: NURSE PRACTITIONER

## 2022-08-26 PROCEDURE — 93010 ELECTROCARDIOGRAM REPORT: CPT | Performed by: INTERNAL MEDICINE

## 2022-08-26 RX ADMIN — PALIPERIDONE 6 MG: 6 TABLET, EXTENDED RELEASE ORAL at 20:11

## 2022-08-26 RX ADMIN — LORAZEPAM 1 MG: 1 TABLET ORAL at 10:11

## 2022-08-26 RX ADMIN — HALOPERIDOL 5 MG: 5 TABLET ORAL at 10:12

## 2022-08-26 RX ADMIN — METFORMIN HYDROCHLORIDE 500 MG: 500 TABLET, EXTENDED RELEASE ORAL at 17:03

## 2022-08-26 RX ADMIN — VALACYCLOVIR HYDROCHLORIDE 1000 MG: 500 TABLET, FILM COATED ORAL at 08:42

## 2022-08-26 ASSESSMENT — ACTIVITIES OF DAILY LIVING (ADL)
ADLS_ACUITY_SCORE: 28
DRESS: SCRUBS (BEHAVIORAL HEALTH)
HYGIENE/GROOMING: INDEPENDENT
LAUNDRY: UNABLE TO COMPLETE
ADLS_ACUITY_SCORE: 28
ORAL_HYGIENE: INDEPENDENT
ADLS_ACUITY_SCORE: 28
ADLS_ACUITY_SCORE: 28

## 2022-08-26 NOTE — PROGRESS NOTES
"Pt was in morning group today with peers and pt stated she was hearing voices and they were saying terrible things to her and about her family. This writer asked what usually helps with the voices and pt stated \"I'll read this handout out loud of everyone to distract myself.\" This writer allowed pt to read out loud for her peers and this seemed to help.   "

## 2022-08-26 NOTE — PLAN OF CARE
Problem: Behavioral Health Plan of Care  Goal: Patient-Specific Goal (Individualization)  Description: Patient will attend 50% of groups when able.  Patient will sleep 6-8 hours per night.  Patient will eat 75% of meals  Patient will be cooperative with treatment team recommendations  Patient will maintain appropriate behavior when on the unit.  Patient will remain independent with ADLs.  Outcome: Ongoing, Progressing    Face to face end of shift report received from night shift RN. Rounding completed. Pt observed in their own bed, with eyes closed, and with rested breathing. Pt up to lobby for breakfast. Pt returns to rest in bed. Pt denies pain. Pt denies SI and HI. Pt reports auditory hallucinations; Pt denies command hallucinations. Pt reports the hallucinations are less bothersome in comparison to what she's been experiencing. Pt accepts and receives this shift's scheduled medications. Pt pleasant, calm, and polite throughout interaction. Pt attends group. 1012 Pt requests and receives Haldol and Ativan prn as ordered. Pt pleasant, calm, and polite throughout interactions. Pt accepts, participates in, and tolerates meeting with Rachele ZIEGLER. Will continue to support the needs of the patient. Face to face end of shift report to be given to evening shift RN.       Problem: Thought Process Alteration  Goal: Optimal Thought Clarity  Description: Patient will contract for safety.  Patient will report decrease in audio/visual hallucinations.  Patient will report linear thoughts prior to discharge  Patient will make needs known  Patient will exhibit decrease in paranoid thinking and behavior by discharge.  Outcome: Ongoing, Progressing     Problem: Suicidal Behavior  Goal: Suicidal Behavior is Absent or Managed  Outcome: Ongoing, Progressing -- Pt remained free of self injury and harm throughout the duration of this shift.

## 2022-08-26 NOTE — PROGRESS NOTES
"[facility] PSYCHIATRY  PROGRESS NOTE     SUBJECTIVE     I found Marely bedresting today. She tells me she is \"doing good, but tired.\" She denies any issues with sleep or appetite and hasn't perceived any side effects from the Invega increase or metformin that was started yesterday. She still hasn't perceived any benefit from them, either, though. Will keep Seroquel 100 mg scheduled for sleep. Her speech is still monotone and a bit delayed and she appears to be responding to internal stimuli. She acknowledges that she still feels \"paranoid and anxious.\" She states that the hallucinations are no better. She states \"they are saying really mean things to me\". Her eye contact is limited. She's made some paranoid statements about her food/drinks while here.  The patient is going to groups and participates appropriately in the unit's milieu. She states she does work on Monday, but wants to stay here until she gets better.        MEDICATIONS   Scheduled Meds:    metFORMIN  500 mg Oral Daily with supper     paliperidone  6 mg Oral At Bedtime     valACYclovir  1,000 mg Oral Daily     PRN Meds:.acetaminophen, alum & mag hydroxide-simethicone, haloperidol **AND** LORazepam, hydrOXYzine, melatonin, QUEtiapine, senna-docusate, simethicone     ALLERGIES   No Known Allergies     MENTAL STATUS EXAM   Vitals: /79 (BP Location: Right arm)   Pulse (!) 139   Temp 98.6  F (37  C) (Temporal)   Resp 16   Ht 1.753 m (5' 9.02\")   SpO2 98%   BMI 28.37 kg/m      Appearance: Alert, oriented, dressed in hospital scrubs  Attitude: Cooperative   Eye Contact: Fair  Mood: \"Depressed\" somewhat anxious  Affect: Restricted range of affect, mood congruent  Speech: Slight reduction in rate. Normal rhythm   Psychomotor Behavior: No tremor, rigidity, akathisia, or psychomotor retardation    Thought Process: Quite preoccupied with hallucinations  Associations: No loose associations   Thought Content: Paranoid delusions continue auditory " "hallucinations continue   Insight: Fair   Judgment: Fair  Oriented to: Person, place, and time  Attention Span and Concentration: Intact  Recent and Remote Memory: Intact  Language: English with appropriate syntax and vocabulary  Fund of Knowledge: Average  Muscle Strength and Tone: Grossly normal  Gait and Station: Grossly normal       LABS   No results found for this or any previous visit (from the past 24 hour(s)).      IMPRESSION           This is a 42 year old female with a PMH of schizophrenia and meth use. She has not used methamphetamine in over 2 months and the auditory hallucinations continue. They are telling her that they will kill her family if she does specific things like \"use my heating pad.\" She does have insight though needs significant amount of reassurance that the voices are related to mental illness and that medication adjustments will likely help decrease them significantly.             DIAGNOSES     Unspecified schizophrenia  Methamphetamine use disorder. Moderate, in early remission       PLAN     Location: Unit   Legal Status: None    Safety Assessment:    Behavioral Orders   Procedures     Code 1 - Restrict to Unit     Routine Programming     As clinically indicated     Status 15     Every 15 minutes.      PTA medications continued/changed:     -Seroquel 100 mg at bedtime. Will continue this (but change to PRN) until Invega is more effective    New medications tried and stopped:     -None    New medications initiated:     Invega 3 mg -> increased to 6 mg on 8/25  -Haldol 5 mg Q6H PRN given with Ativan 1 mg Q6H PRN  -Metformin  mg at suppertime  -Utilize standard unit PRNs for comfort and safety    Today's Changes: None. Will titrate medications for efficacy    Programming: Patient will be treated in a therapeutic milieu with appropriate individual and group therapies. Education will be provided on diagnoses, medications, and treatments.     Medical diagnoses:  Per " medicine    Consult: None    Labs none needed at this time    Anticipated LOS: 3 to 5 days to stabilize on medications  Disposition: Home with services       TREATMENT TEAM CARE PLAN     Progress: Continued symptoms.     Continued Stay Criteria/Rationale: Continued symptoms without sufficient improvement/resolution.    Medical/Physical: See above.    Precautions: See above.     Plan: Continue inpatient care with unit support and medication management.    Rationale for change in precautions or plan: NA due to no change.    Participants: Rachele ANSARI, CNP, Nursing, SW, OT    The patient's care was discussed with the treatment team and chart notes were reviewed.       ATTESTATION      Patient has been seen and evaluated by me,  COTY Hendrix CNP

## 2022-08-26 NOTE — PLAN OF CARE
Problem: Suicidal Behavior  Goal: Suicidal Behavior is Absent or Managed  Outcome: Ongoing, Progressing     Problem: Thought Process Alteration  Goal: Optimal Thought Clarity  Description: Patient will contract for safety.  Patient will report decrease in audio/visual hallucinations.  Patient will report linear thoughts prior to discharge  Patient will make needs known  Patient will exhibit decrease in paranoid thinking and behavior by discharge.     Outcome: Ongoing, Progressing     Problem: Behavioral Health Plan of Care  Goal: Patient-Specific Goal (Individualization)  Description: Patient will attend 50% of groups when able.  Patient will sleep 6-8 hours per night.  Patient will eat 75% of meals  Patient will be cooperative with treatment team recommendations  Patient will maintain appropriate behavior when on the unit.  Patient will remain independent with ADLs.  Outcome: Ongoing, Progressing        Note: Shift Summary:  Face to Face report received from Clarice PABLO RN.  Rounding completed. Patient observed in her room with eyes closed appearing to sleep for 7 hours; resting respirations noted on 15 minute safety checks.  Will continue to monitor.  Face to face end of shift report [facility]     0600 Patients pulse was 141; she is nonsymptomatic and states she feels just fine.  Rest of vitals were WNL.  Patient denied need for anxiety medications.    Rosalina Murcia RN  8/26/2022  12:18 AM

## 2022-08-27 PROCEDURE — 250N000013 HC RX MED GY IP 250 OP 250 PS 637: Performed by: NURSE PRACTITIONER

## 2022-08-27 PROCEDURE — 99233 SBSQ HOSP IP/OBS HIGH 50: CPT | Performed by: NURSE PRACTITIONER

## 2022-08-27 PROCEDURE — 124N000001 HC R&B MH

## 2022-08-27 RX ORDER — QUETIAPINE FUMARATE 100 MG/1
100 TABLET, FILM COATED ORAL 3 TIMES DAILY PRN
Status: DISCONTINUED | OUTPATIENT
Start: 2022-08-27 | End: 2022-08-28

## 2022-08-27 RX ADMIN — VALACYCLOVIR HYDROCHLORIDE 1000 MG: 500 TABLET, FILM COATED ORAL at 08:00

## 2022-08-27 RX ADMIN — MELATONIN 3 MG: 3 TAB ORAL at 20:26

## 2022-08-27 RX ADMIN — HALOPERIDOL 5 MG: 5 TABLET ORAL at 08:00

## 2022-08-27 RX ADMIN — METFORMIN HYDROCHLORIDE 500 MG: 500 TABLET, EXTENDED RELEASE ORAL at 16:39

## 2022-08-27 RX ADMIN — PALIPERIDONE 6 MG: 6 TABLET, EXTENDED RELEASE ORAL at 20:26

## 2022-08-27 RX ADMIN — QUETIAPINE 100 MG: 100 TABLET, FILM COATED ORAL at 20:26

## 2022-08-27 RX ADMIN — QUETIAPINE 100 MG: 100 TABLET, FILM COATED ORAL at 08:34

## 2022-08-27 RX ADMIN — LORAZEPAM 1 MG: 1 TABLET ORAL at 08:00

## 2022-08-27 ASSESSMENT — ACTIVITIES OF DAILY LIVING (ADL)
ADLS_ACUITY_SCORE: 28
HYGIENE/GROOMING: INDEPENDENT
ADLS_ACUITY_SCORE: 28
ADLS_ACUITY_SCORE: 28
LAUNDRY: UNABLE TO COMPLETE
ORAL_HYGIENE: INDEPENDENT
ADLS_ACUITY_SCORE: 28
DRESS: SCRUBS (BEHAVIORAL HEALTH)
ADLS_ACUITY_SCORE: 28

## 2022-08-27 NOTE — PLAN OF CARE
Problem: Behavioral Health Plan of Care  Goal: Patient-Specific Goal (Individualization)  Description: Patient will attend 50% of groups when able.  Patient will sleep 6-8 hours per night.  Patient will eat 75% of meals  Patient will be cooperative with treatment team recommendations  Patient will maintain appropriate behavior when on the unit.  Patient will remain independent with ADLs.  Outcome: Ongoing, Progressing  Note: Pt appeared to be sleeping 7 hours tonight. Regular respirations and position changes noted throughout the night.    Face to face end of shift report communicated to oncoming RN.      Problem: Thought Process Alteration  Goal: Optimal Thought Clarity  Description: Patient will contract for safety.  Patient will report decrease in audio/visual hallucinations.  Patient will report linear thoughts prior to discharge  Patient will make needs known  Patient will exhibit decrease in paranoid thinking and behavior by discharge.   Note: CHENTE - pt sleeping.      Problem: Suicidal Behavior  Goal: Suicidal Behavior is Absent or Managed  Outcome: Ongoing, Progressing  Note: Pt remained free from self harm.

## 2022-08-27 NOTE — PLAN OF CARE
"Face to face shift report received from RHODA Ritchei.       Problem: Behavioral Health Plan of Care  Goal: Patient-Specific Goal (Individualization)  Description: Patient will attend 50% of groups when able.  Patient will sleep 6-8 hours per night.  Patient will eat 75% of meals  Patient will be cooperative with treatment team recommendations  Patient will maintain appropriate behavior when on the unit.  Patient will remain independent with ADLs.  Outcome: Ongoing, Progressing   Calm and cooperative. Reports \"I'm doing okay.\" Denies thoughts of harming self or others. Denied A/V hallucinations but appears preoccupied this shift. Denied thoughts of martians. Flat affect. Withdrawn to room majority of shift but was in lounge for dinner. No reports of pain. Attended group this shift.   Requested and received Seroquel 100mg and Melatonin 3mg with HS medications.     Problem: Thought Process Alteration  Goal: Optimal Thought Clarity  Description: Patient will contract for safety.  Patient will report decrease in audio/visual hallucinations.  Patient will report linear thoughts prior to discharge  Patient will make needs known  Patient will exhibit decrease in paranoid thinking and behavior by discharge.  Outcome: Ongoing, Progressing         Face to face end of shift report to be communicated to oncoming RN.       "

## 2022-08-27 NOTE — PLAN OF CARE
"Face to face shift report received from RHODA Ritchie.       Problem: Behavioral Health Plan of Care  Goal: Patient-Specific Goal (Individualization)  Description: Patient will attend 50% of groups when able.  Patient will sleep 6-8 hours per night.  Patient will eat 75% of meals  Patient will be cooperative with treatment team recommendations  Patient will maintain appropriate behavior when on the unit.  Patient will remain independent with ADLs.  Outcome: Ongoing, Progressing   Calm and cooperative. Medication compliant. Denies thoughts of harming self or others. Reports auditory hallucinations, denies these are command in nature. Withdrawn to room majority of shift. . Pt reports \"I had a heart attack a couple months ago. This isn't a heart attack.\" Rachele, REYNALDO updated. EKG ordered and obtained. Rachele updated on results. No reports of pain. No delusional, or paranoid statements this shift.     Problem: Thought Process Alteration  Goal: Optimal Thought Clarity  Description: Patient will contract for safety.  Patient will report decrease in audio/visual hallucinations.  Patient will report linear thoughts prior to discharge  Patient will make needs known  Patient will exhibit decrease in paranoid thinking and behavior by discharge.  Outcome: Ongoing, Progressing       Problem: Suicidal Behavior  Goal: Suicidal Behavior is Absent or Managed  Outcome: Ongoing, Progressing   Free from self harm or injury this shift.       Face to face end of shift report to be communicated to oncoming RN.       "

## 2022-08-27 NOTE — PROGRESS NOTES
"[facility] PSYCHIATRY  PROGRESS NOTE     SUBJECTIVE     I found Marely bedresting today. Earlier today she reported unbearable auditory hallucinations that were telling her Martians were poisoning her food and spraying things in her room that she could smell. She reports the PRN Haldol/Ativan combo \"works ok, but it doesn't relax me like the Seroquel does.\" We discussed PRN options and decided to increase the availability of the Seroquel from before bedtime to TID PRN. We will keep the PRN Haldol/Ativan combo available and Marely has agreed to monitor her response to each of them. Would prefer not to have her taking multiple neuroleptics if possible. She continues to endorse some passive suicidal thoughts, though denies plan or intent. She endorses feeling \"a little tired and anxious,\" but states she feels safe in the hospital.        MEDICATIONS   Scheduled Meds:    metFORMIN  500 mg Oral Daily with supper     paliperidone  6 mg Oral At Bedtime     valACYclovir  1,000 mg Oral Daily     PRN Meds:.acetaminophen, alum & mag hydroxide-simethicone, haloperidol **AND** LORazepam, hydrOXYzine, melatonin, QUEtiapine, senna-docusate, simethicone     ALLERGIES   No Known Allergies     MENTAL STATUS EXAM   Vitals: /76 (BP Location: Right arm)   Pulse (!) 139   Temp 97.8  F (36.6  C) (Temporal)   Resp 14   Ht 1.753 m (5' 9.02\")   Wt 106.1 kg (233 lb 14.4 oz)   SpO2 98%   BMI 34.52 kg/m      Appearance: Alert, oriented, dressed in hospital scrubs  Attitude: Cooperative   Eye Contact: Fair  Mood: \"Depressed\" somewhat anxious  Affect: Restricted range of affect, mood congruent  Speech: Slight reduction in rate. Normal rhythm   Psychomotor Behavior: No tremor, rigidity, akathisia, or psychomotor retardation    Thought Process: Quite preoccupied with hallucinations  Associations: No loose associations   Thought Content: Paranoid delusions and auditory hallucinations persist   Insight: Fair   Judgment: Fair  Oriented " "to: Person, place, and time  Attention Span and Concentration: Intact  Recent and Remote Memory: Intact  Language: English with appropriate syntax and vocabulary  Fund of Knowledge: Average  Muscle Strength and Tone: Grossly normal  Gait and Station: Grossly normal       LABS   No results found for this or any previous visit (from the past 24 hour(s)).      IMPRESSION           This is a 42 year old female with a PMH of schizophrenia and meth use. She has not used methamphetamine in over 2 months and the auditory hallucinations continue. They are telling her that they will kill her family if she does specific things like \"use my heating pad.\" She does have insight though needs significant amount of reassurance that the voices are related to mental illness and that medication adjustments will likely help decrease them significantly.             DIAGNOSES     Unspecified schizophrenia  Methamphetamine use disorder. Moderate, in early remission       PLAN     Location: Unit 5  Legal Status: None    Safety Assessment:    Behavioral Orders   Procedures     Code 1 - Restrict to Unit     Routine Programming     As clinically indicated     Status 15     Every 15 minutes.      PTA medications continued/changed:     -Increase Seroquel to 100 mg TID PRN. Will continue this until Invega is more effective    New medications tried and stopped:     -None    New medications initiated:     Invega 3 mg -> increased to 6 mg on 8/25. Monitor for tachycardia  -Haldol 5 mg Q6H PRN given with Ativan 1 mg Q6H PRN  -Metformin  mg at suppertime  -Utilize standard unit PRNs for comfort and safety    Today's Changes: None. Will titrate medications for efficacy    Programming: Patient will be treated in a therapeutic milieu with appropriate individual and group therapies. Education will be provided on diagnoses, medications, and treatments.     Medical diagnoses:  Per medicine    Consult: None    Labs none needed at this " time    Anticipated LOS: 3 to 5 days to stabilize on medications  Disposition: Home with services       TREATMENT TEAM CARE PLAN     Progress: Continued symptoms.     Continued Stay Criteria/Rationale: Continued symptoms without sufficient improvement/resolution.    Medical/Physical: See above.    Precautions: See above.     Plan: Continue inpatient care with unit support and medication management.    Rationale for change in precautions or plan: NA due to no change.    Participants: Rachele ANSARI, CNP, Nursing    The patient's care was discussed with the treatment team and chart notes were reviewed.       ATTESTATION      Patient has been seen and evaluated by me,  COTY Hendrix CNP

## 2022-08-27 NOTE — PLAN OF CARE
"  Problem: Behavioral Health Plan of Care  Goal: Patient-Specific Goal (Individualization)  Description: Patient will attend 50% of groups when able.  Patient will sleep 6-8 hours per night.  Patient will eat 75% of meals  Patient will be cooperative with treatment team recommendations  Patient will maintain appropriate behavior when on the unit.  Patient will remain independent with ADLs.  Outcome: Ongoing, Progressing    Face to face end of shift report received from night shift RN. Rounding completed. Pt observed sitting in the lobby. Pt reports intense auditory hallucinations; Pt states, \"I've been hearing them all night.\" Pt reports the hallucinations are commanding her to do things, but she does not share what the hallucinations are directing her to do. Pt denies intent to act on the hallucinations. Pt denies SI and HI. Pt denies pain. 0800 Haldol and Ativan offer to and accepted by pt prn as ordered. Pt accepts and receives this shift's scheduled medications. Pt sits in lounge for breakfast. Pt up pacing the hallway; Pt tearful. Pt states, \"They're doing stuff to my food! And the smell!\" Verbal de-escalation utilized. 0830 Rachele NP called and notified of pt's current condition; Rachele NP gives the verbal okay to give the bedtime prn of Seroquel; Rachele NP reports they will be in the meet with the patient and make modifications to the orders. 0834 Seroquel offered to and accepted by pt prn as ordered. Pt rests in bed. 0930 Pt up to nurse's station; Pt requests this writer to speak privately with her in her room; Pt reports Martians are poisoning her food and spraying something in her room that she can smell. Therapeutic communication and open ended questions utilized to explore ideas to help pt cope; Pt reports earplugs help; Pt reports reading and listening to music do not help as the Martians read and sing along with her. Pt observed sitting in lobby with earplugs in use after interaction. Pt continues " to lack insight that she's experiencing delusions. Pt attends group. Will continue to support the needs of the patient. Face to face end of shift report to be given to evening shift RN.       Problem: Thought Process Alteration  Goal: Optimal Thought Clarity  Description: Patient will contract for safety.  Patient will report decrease in audio/visual hallucinations.  Patient will report linear thoughts prior to discharge  Patient will make needs known  Patient will exhibit decrease in paranoid thinking and behavior by discharge.  Outcome: Ongoing, Not Progressing     Problem: Suicidal Behavior  Goal: Suicidal Behavior is Absent or Managed  Outcome: Ongoing, Progressing

## 2022-08-28 PROCEDURE — 250N000013 HC RX MED GY IP 250 OP 250 PS 637: Performed by: NURSE PRACTITIONER

## 2022-08-28 PROCEDURE — 124N000001 HC R&B MH

## 2022-08-28 PROCEDURE — 99233 SBSQ HOSP IP/OBS HIGH 50: CPT | Performed by: NURSE PRACTITIONER

## 2022-08-28 RX ORDER — HALOPERIDOL 10 MG/1
10 TABLET ORAL EVERY 6 HOURS PRN
Status: DISCONTINUED | OUTPATIENT
Start: 2022-08-28 | End: 2022-08-29

## 2022-08-28 RX ORDER — QUETIAPINE FUMARATE 100 MG/1
100 TABLET, FILM COATED ORAL
Status: DISCONTINUED | OUTPATIENT
Start: 2022-08-28 | End: 2022-08-31 | Stop reason: HOSPADM

## 2022-08-28 RX ORDER — LORAZEPAM 1 MG/1
1 TABLET ORAL EVERY 6 HOURS PRN
Status: DISCONTINUED | OUTPATIENT
Start: 2022-08-28 | End: 2022-08-29

## 2022-08-28 RX ORDER — QUETIAPINE FUMARATE 100 MG/1
100 TABLET, FILM COATED ORAL AT BEDTIME
Status: DISCONTINUED | OUTPATIENT
Start: 2022-08-28 | End: 2022-08-28

## 2022-08-28 RX ADMIN — MELATONIN 3 MG: 3 TAB ORAL at 20:39

## 2022-08-28 RX ADMIN — LORAZEPAM 1 MG: 1 TABLET ORAL at 15:32

## 2022-08-28 RX ADMIN — METFORMIN HYDROCHLORIDE 500 MG: 500 TABLET, EXTENDED RELEASE ORAL at 16:54

## 2022-08-28 RX ADMIN — PALIPERIDONE 6 MG: 6 TABLET, EXTENDED RELEASE ORAL at 20:39

## 2022-08-28 RX ADMIN — HALOPERIDOL 5 MG: 5 TABLET ORAL at 07:37

## 2022-08-28 RX ADMIN — VALACYCLOVIR HYDROCHLORIDE 1000 MG: 500 TABLET, FILM COATED ORAL at 08:25

## 2022-08-28 RX ADMIN — LORAZEPAM 1 MG: 1 TABLET ORAL at 07:37

## 2022-08-28 RX ADMIN — QUETIAPINE 100 MG: 100 TABLET ORAL at 20:39

## 2022-08-28 RX ADMIN — HALOPERIDOL 10 MG: 10 TABLET ORAL at 15:32

## 2022-08-28 ASSESSMENT — ACTIVITIES OF DAILY LIVING (ADL)
ADLS_ACUITY_SCORE: 28
LAUNDRY: UNABLE TO COMPLETE
ADLS_ACUITY_SCORE: 28
ADLS_ACUITY_SCORE: 28
HYGIENE/GROOMING: INDEPENDENT
ADLS_ACUITY_SCORE: 28
DRESS: SCRUBS (BEHAVIORAL HEALTH)
ORAL_HYGIENE: INDEPENDENT
ADLS_ACUITY_SCORE: 28

## 2022-08-28 NOTE — PLAN OF CARE
Problem: Behavioral Health Plan of Care  Goal: Patient-Specific Goal (Individualization)  Description: Patient will attend 50% of groups when able.  Patient will sleep 6-8 hours per night.  Patient will eat 75% of meals  Patient will be cooperative with treatment team recommendations  Patient will maintain appropriate behavior when on the unit.  Patient will remain independent with ADLs.  Outcome: Ongoing, Progressing    Face to face end of shift report received from night shift RN. Rounding completed. Pt observed laying awake in her own bed. Pt reports auditory hallucinations commanding her to not go into the bathroom and to leave the door open: Pt denies SI and HI. Pt denies pain. Pt visibly distressed during assessment; Discussion on available prns initiated. Pt reports Seroquel is effective, but she reports Seroquel makes her sleep. [id number] Pt requests and receives Haldol and Ativan prn as ordered. Pt out to lobby for breakfast. Pt showers. Pt accepts and receives this shift's scheduled medications. Pt attends group. Pt requests to discuss with this writer questions regarding discharging; Therapeutic communication utilized as Pt expresses her thoughts and feelings; Pt reports she doesn't feel the auditory hallucinations will go away, and she reports the only difference with her being on the  unit vs at home is she feels safer on the  unit; Pt informed of 12 hour intent to leave form; Pt reports not knowing if she wants to sign the form and/or discharge at this time; Pt thanks this writer for answering her questions. Pt rests in bed. Pt meets with Rachele ZIEGLER. [id number] Pt requests and receives Haldol and Ativan prn as ordered. Will continue to support the needs of the patient. Face to face end of shift report to be given to evening shift RN.       Problem: Thought Process Alteration  Goal: Optimal Thought Clarity  Description: Patient will contract for safety.  Patient will report decrease in audio/visual  hallucinations.  Patient will report linear thoughts prior to discharge  Patient will make needs known  Patient will exhibit decrease in paranoid thinking and behavior by discharge.  Outcome: Ongoing, Progressing     Problem: Suicidal Behavior  Goal: Suicidal Behavior is Absent or Managed  Outcome: Ongoing, Progressing -- Pt remained free of self injury and harm throughout the duration of this shift.

## 2022-08-28 NOTE — PROGRESS NOTES
"[facility] PSYCHIATRY  PROGRESS NOTE     SUBJECTIVE     I found Marely bedresting today. She tells me the voices are still saying derogatory things about her at times, but they are quite now. She c/o excessive fatigue with the Seroquel, so will change that back to before bed and increase the PRN Haldol dose from 5 to 10 mg. She denies suicidal thoughts today. She denies any issues with sleep or appetite. She tells me she is not feeling overly depressed or anxious, she's just still struggling with the auditory hallucinations. Hopeful for discharge by the middle of this week.        MEDICATIONS   Scheduled Meds:    metFORMIN  500 mg Oral Daily with supper     paliperidone  6 mg Oral At Bedtime     PRN Meds:.acetaminophen, alum & mag hydroxide-simethicone, haloperidol **AND** LORazepam, hydrOXYzine, melatonin, QUEtiapine, senna-docusate, simethicone     ALLERGIES   No Known Allergies     MENTAL STATUS EXAM   Vitals: /85 (BP Location: Right arm)   Pulse (!) 132   Temp 98.6  F (37  C) (Temporal)   Resp 16   Ht 1.753 m (5' 9.02\")   Wt 106.1 kg (233 lb 14.4 oz)   SpO2 98%   BMI 34.52 kg/m      Appearance: Alert, oriented, dressed in hospital scrubs  Attitude: Cooperative   Eye Contact: Fair  Mood: \"Ok\"  Affect: Restricted range of affect, mood congruent  Speech: Slight reduction in rate. Normal rhythm   Psychomotor Behavior: No tremor, rigidity, akathisia, or psychomotor retardation    Thought Process: Quite preoccupied with hallucinations  Associations: No loose associations   Thought Content: Paranoid delusions and auditory hallucinations persist   Insight: Fair   Judgment: Fair  Oriented to: Person, place, and time  Attention Span and Concentration: Intact  Recent and Remote Memory: Intact  Language: English with appropriate syntax and vocabulary  Fund of Knowledge: Average  Muscle Strength and Tone: Grossly normal  Gait and Station: Grossly normal       LABS   No results found for this or any previous " "visit (from the past 24 hour(s)).      IMPRESSION           This is a 42 year old female with a PMH of schizophrenia and meth use. She has not used methamphetamine in over 2 months and the auditory hallucinations continue. They are telling her that they will kill her family if she does specific things like \"use my heating pad.\" She does have insight though needs significant amount of reassurance that the voices are related to mental illness and that medication adjustments will likely help decrease them significantly.             DIAGNOSES     Unspecified schizophrenia  Methamphetamine use disorder. Moderate, in early remission       PLAN     Location: Unit 5  Legal Status: None    Safety Assessment:    Behavioral Orders   Procedures     Code 1 - Restrict to Unit     Routine Programming     As clinically indicated     Status 15     Every 15 minutes.      PTA medications continued/changed:     -Change Seroquel 100 mg back to PRN before bed. Will continue this until Invega is more effective    New medications tried and stopped:     -None    New medications initiated:     Invega 3 mg -> increased to 6 mg on 8/25. Monitor for tachycardia  -Haldol 5 mg Q6H PRN given with Ativan 1 mg Q6H PRN -> increased Haldol to 10 mg   -Metformin  mg at suppertime  -Utilize standard unit PRNs for comfort and safety    Today's Changes: Target auditory hallucinations    Programming: Patient will be treated in a therapeutic milieu with appropriate individual and group therapies. Education will be provided on diagnoses, medications, and treatments.     Medical diagnoses:  Per medicine    Consult: None    Labs none needed at this time    Anticipated LOS: 3 to 5 days to stabilize on medications  Disposition: Home with services       TREATMENT TEAM CARE PLAN     Progress: Continued symptoms.     Continued Stay Criteria/Rationale: Continued symptoms without sufficient improvement/resolution.    Medical/Physical: See above.    Precautions: " See above.     Plan: Continue inpatient care with unit support and medication management.    Rationale for change in precautions or plan: NA due to no change.    Participants: Rachele ANSARI, CNP, Nursing    The patient's care was discussed with the treatment team and chart notes were reviewed.       ATTESTATION      Patient has been seen and evaluated by me,  COTY Hendrix CNP

## 2022-08-28 NOTE — PLAN OF CARE
Face to face shift report received from RHODA Ritchie.       Problem: Behavioral Health Plan of Care  Goal: Patient-Specific Goal (Individualization)  Description: Patient will attend 50% of groups when able.  Patient will sleep 6-8 hours per night.  Patient will eat 75% of meals  Patient will be cooperative with treatment team recommendations  Patient will maintain appropriate behavior when on the unit.  Patient will remain independent with ADLs.  Outcome: Ongoing, Progressing   Calm and cooperative. Medication compliant. Denies thoughts of harming self or others. Reports hearing voices, reports they are making negative statements. Reports voices have been present throughout the day. Seroquel 100mg administered with HS medications. Spent time in [place] playing cards with peers. Pleasant, but guarded in conversation.   REYNALDO Jeffrey updated about elevated heart rate, 120. No new orders at this time.   Problem: Thought Process Alteration  Goal: Optimal Thought Clarity  Description: Patient will contract for safety.  Patient will report decrease in audio/visual hallucinations.  Patient will report linear thoughts prior to discharge  Patient will make needs known  Patient will exhibit decrease in paranoid thinking and behavior by discharge.  Outcome: Ongoing, Progressing       Problem: Suicidal Behavior  Goal: Suicidal Behavior is Absent or Managed  Outcome: Ongoing, Progressing         Face to face end of shift report to be communicated to oncoming RN.

## 2022-08-28 NOTE — PLAN OF CARE
SHIFT SUMMARY:        Problem: Behavioral Health Plan of Care  Goal: Patient-Specific Goal (Individualization)  Description: Patient will attend 50% of groups when able.  Patient will sleep 6-8 hours per night.  Patient will eat 75% of meals  Patient will be cooperative with treatment team recommendations  Patient will maintain appropriate behavior when on the unit.  Patient will remain independent with ADLs.  Outcome: Ongoing, Progressing     Problem: Thought Process Alteration  Goal: Optimal Thought Clarity  Description: Patient will contract for safety.  Patient will report decrease in audio/visual hallucinations.  Patient will report linear thoughts prior to discharge  Patient will make needs known  Patient will exhibit decrease in paranoid thinking and behavior by discharge.     Outcome: Ongoing, Progressing     Problem: Suicidal Behavior  Goal: Suicidal Behavior is Absent or Managed  Outcome: Ongoing, Progressing   Goal Outcome Evaluation:

## 2022-08-28 NOTE — PLAN OF CARE
SHIFT SUMMARY:  Rounding completed. Patient observed in her room with eyes closed appearing to sleep for 6.5 hours; resting respirations noted on 15 minute safety checks.  Will continue to monitor.  Face to face end of shift report communicated to oncoming day shift RN.     Problem: Behavioral Health Plan of Care  Goal: Patient-Specific Goal (Individualization)  Description: Patient will attend 50% of groups when able.  Patient will sleep 6-8 hours per night.  Patient will eat 75% of meals  Patient will be cooperative with treatment team recommendations  Patient will maintain appropriate behavior when on the unit.  Patient will remain independent with ADLs.  8/28/2022 0257 by Remington Bello RN  Outcome: Ongoing, Progressing  8/28/2022 0013 by Remington Bello RN  Outcome: Ongoing, Progressing  Goal: Absence of New-Onset Illness or Injury  Outcome: Ongoing, Progressing     Problem: Thought Process Alteration  Goal: Optimal Thought Clarity  Description: Patient will contract for safety.  Patient will report decrease in audio/visual hallucinations.  Patient will report linear thoughts prior to discharge  Patient will make needs known  Patient will exhibit decrease in paranoid thinking and behavior by discharge.     8/28/2022 0257 by Remington Bello RN  Outcome: Ongoing, Progressing  8/28/2022 0013 by Remington Bello RN  Outcome: Ongoing, Progressing     Problem: Suicidal Behavior  Goal: Suicidal Behavior is Absent or Managed  8/28/2022 0257 by Remington Bello RN  Outcome: Ongoing, Progressing  8/28/2022 0013 by Remington Bello RN  Outcome: Ongoing, Progressing   Goal Outcome Evaluation:

## 2022-08-29 PROCEDURE — 124N000001 HC R&B MH

## 2022-08-29 PROCEDURE — 250N000013 HC RX MED GY IP 250 OP 250 PS 637: Performed by: NURSE PRACTITIONER

## 2022-08-29 PROCEDURE — 99233 SBSQ HOSP IP/OBS HIGH 50: CPT | Performed by: NURSE PRACTITIONER

## 2022-08-29 RX ORDER — HALOPERIDOL 10 MG/1
10 TABLET ORAL 2 TIMES DAILY
Status: DISCONTINUED | OUTPATIENT
Start: 2022-08-29 | End: 2022-08-29

## 2022-08-29 RX ORDER — LORAZEPAM 1 MG/1
1 TABLET ORAL 2 TIMES DAILY
Status: DISCONTINUED | OUTPATIENT
Start: 2022-08-29 | End: 2022-08-29

## 2022-08-29 RX ORDER — HALOPERIDOL 10 MG/1
10 TABLET ORAL 2 TIMES DAILY
Status: DISCONTINUED | OUTPATIENT
Start: 2022-08-29 | End: 2022-08-31 | Stop reason: HOSPADM

## 2022-08-29 RX ORDER — LORAZEPAM 1 MG/1
1 TABLET ORAL 2 TIMES DAILY
Status: DISCONTINUED | OUTPATIENT
Start: 2022-08-29 | End: 2022-08-31 | Stop reason: HOSPADM

## 2022-08-29 RX ADMIN — METFORMIN HYDROCHLORIDE 500 MG: 500 TABLET, EXTENDED RELEASE ORAL at 16:50

## 2022-08-29 RX ADMIN — ALUMINUM HYDROXIDE, MAGNESIUM HYDROXIDE, AND SIMETHICONE 30 ML: 200; 200; 20 SUSPENSION ORAL at 20:22

## 2022-08-29 RX ADMIN — HALOPERIDOL 10 MG: 10 TABLET ORAL at 20:22

## 2022-08-29 RX ADMIN — LORAZEPAM 1 MG: 1 TABLET ORAL at 11:02

## 2022-08-29 RX ADMIN — LORAZEPAM 1 MG: 1 TABLET ORAL at 20:22

## 2022-08-29 RX ADMIN — HALOPERIDOL 10 MG: 10 TABLET ORAL at 11:03

## 2022-08-29 ASSESSMENT — ACTIVITIES OF DAILY LIVING (ADL)
LAUNDRY: UNABLE TO COMPLETE
DRESS: SCRUBS (BEHAVIORAL HEALTH);INDEPENDENT
ADLS_ACUITY_SCORE: 28
HYGIENE/GROOMING: INDEPENDENT
ADLS_ACUITY_SCORE: 28
ADLS_ACUITY_SCORE: 28
HYGIENE/GROOMING: INDEPENDENT
ADLS_ACUITY_SCORE: 28
ORAL_HYGIENE: INDEPENDENT
ADLS_ACUITY_SCORE: 28
DRESS: INDEPENDENT
ADLS_ACUITY_SCORE: 28
ORAL_HYGIENE: INDEPENDENT
ADLS_ACUITY_SCORE: 28

## 2022-08-29 NOTE — PLAN OF CARE
Problem: Behavioral Health Plan of Care  Goal: Patient-Specific Goal (Individualization)  Description: Patient will attend 50% of groups when able.  Patient will sleep 6-8 hours per night.  Patient will eat 75% of meals  Patient will be cooperative with treatment team recommendations  Patient will maintain appropriate behavior when on the unit.  Patient will remain independent with ADLs.  Outcome: Ongoing, Progressing   Goal Outcome Evaluation:      Face to face shift report received from RN. Rounding completed, pt observed. Client rested in room for 7 hours with eyes closed and respirations noted. No signs of distress. Face to face report will be communicated to oncoming RN.    Vlad Scales RN  8/29/2022  6:21 AM

## 2022-08-29 NOTE — PLAN OF CARE
Face to face shift report received from Vlad DEL CASTILLO RN. Rounding completed, pt observed.    Problem: Behavioral Health Plan of Care  Goal: Patient-Specific Goal (Individualization)  Description: Patient will attend 50% of groups when able.  Patient will sleep 6-8 hours per night.  Patient will eat 75% of meals  Patient will be cooperative with treatment team recommendations  Patient will maintain appropriate behavior when on the unit.  Patient will remain independent with ADLs.  Outcome: Ongoing, Progressing  Note: Shift Summary:  Patient was awake in her room at the start of this shift.  Patient up for meals.  Eyes darting at times.  Admits to auditory hallucinations that are demeaning to her at times.  Asking to discharge but willing to stay with current medication change today.  Haldol 10 mg po and Ativan 1 mg po administered at 1103 with improvement by lunch as reported by patient.  Denies pain or unwanted side effects.     Problem: Thought Process Alteration  Goal: Optimal Thought Clarity  Description: Patient will contract for safety.  Patient will report decrease in audio/visual hallucinations.  Patient will report linear thoughts prior to discharge  Patient will make needs known  Patient will exhibit decrease in paranoid thinking and behavior by discharge.     Outcome: Ongoing, Progressing  Face to face report will be communicated to oncoming RN.    Clarice Parker RN  8/29/2022

## 2022-08-29 NOTE — PROGRESS NOTES
"[facility] PSYCHIATRY  PROGRESS NOTE     SUBJECTIVE     I found Marely bedresting today. She tells me the voices are \"further away today.\" She is going to groups and socializing with peers intermittently, which she reports \"sometimes helps\" provide distraction for the voices. Patient denies suicidal or homicidal ideation. She denies any issues with sleep or appetite. We talked about how I suspected the Invega was causing her tachycardia (HR consistently 120+ bpm) and given her recent hx of NSTEMI, I certainly don't want to worsen her cardiac function, so will schedule Haldol 10 mg and Ativan 1 mg BID instead. She does not perceive any side effects from the Haldol and notes it does not make her tired like the Seroquel does. She is hopeful for discharge tomorrow, but knows that I would like to ensure her auditory hallucinations are manageable prior to discharge. She does appear brighter today.        MEDICATIONS   Scheduled Meds:    metFORMIN  500 mg Oral Daily with supper     PRN Meds:.acetaminophen, alum & mag hydroxide-simethicone, haloperidol **AND** LORazepam, hydrOXYzine, melatonin, QUEtiapine, senna-docusate, simethicone     ALLERGIES   No Known Allergies     MENTAL STATUS EXAM   Vitals: /94 (BP Location: Right arm)   Pulse (!) 148   Temp 98  F (36.7  C) (Temporal)   Resp 16   Ht 1.753 m (5' 9.02\")   Wt 106.1 kg (233 lb 14.4 oz)   SpO2 98%   BMI 34.52 kg/m      Appearance: Alert, oriented, dressed in hospital scrubs  Attitude: Cooperative   Eye Contact: Fair  Mood: \"Ok\"  Affect: mood congruent - smiled today  Speech: Slight reduction in rate. Normal rhythm   Psychomotor Behavior: No tremor, rigidity, akathisia, or psychomotor retardation    Thought Process: appears preoccupied with hallucinations  Associations: No loose associations   Thought Content: Paranoid delusions and auditory hallucinations persist, though they are improving  Insight: Fair   Judgment: Fair  Oriented to: Person, place, and " "time  Attention Span and Concentration: Intact  Recent and Remote Memory: Intact  Language: English with appropriate syntax and vocabulary  Fund of Knowledge: Average  Muscle Strength and Tone: Grossly normal  Gait and Station: Grossly normal       LABS   No results found for this or any previous visit (from the past 24 hour(s)).      IMPRESSION           This is a 42 year old female with a PMH of schizophrenia and meth use. She has not used methamphetamine in over 2 months and the auditory hallucinations continue. They are telling her that they will kill her family if she does specific things like \"use my heating pad.\" She does have insight though needs significant amount of reassurance that the voices are related to mental illness and that medication adjustments will likely help decrease them significantly.               DIAGNOSES     Unspecified schizophrenia  Methamphetamine use disorder. Moderate, in early remission       PLAN     Location: Unit 5  Legal Status: None    Safety Assessment:    Behavioral Orders   Procedures     Code 1 - Restrict to Unit     Routine Programming     As clinically indicated     Status 15     Every 15 minutes.      PTA medications continued/changed:     -Change Seroquel 100 mg back to PRN before bed. Will continue this until Invega is more effective    New medications tried and stopped:     -Invega 6 mg - caused tachycardia    New medications initiated:     -Change PRN Haldol 10 mg and Ativan 1 mg to BID scheduled  -Metformin  mg at suppertime  -Utilize standard unit PRNs for comfort and safety    Today's Changes: Target auditory hallucinations    Programming: Patient will be treated in a therapeutic milieu with appropriate individual and group therapies. Education will be provided on diagnoses, medications, and treatments.     Medical diagnoses:  Per medicine    Consult: None    Labs none needed at this time    Anticipated LOS: 3 to 5 days to stabilize on " medications  Disposition: Home with services       TREATMENT TEAM CARE PLAN     Progress: Continued symptoms.     Continued Stay Criteria/Rationale: Continued symptoms without sufficient improvement/resolution.    Medical/Physical: See above.    Precautions: See above.     Plan: Continue inpatient care with unit support and medication management.    Rationale for change in precautions or plan: NA due to no change.    Participants: Rachele ANSARI, CNP, Nursing, SW, OT    The patient's care was discussed with the treatment team and chart notes were reviewed.       ATTESTATION      Patient has been seen and evaluated by me,  COTY Hendrix CNP

## 2022-08-30 PROCEDURE — 99239 HOSP IP/OBS DSCHRG MGMT >30: CPT | Performed by: NURSE PRACTITIONER

## 2022-08-30 PROCEDURE — 124N000001 HC R&B MH

## 2022-08-30 PROCEDURE — 250N000013 HC RX MED GY IP 250 OP 250 PS 637: Performed by: NURSE PRACTITIONER

## 2022-08-30 RX ORDER — HALOPERIDOL 10 MG/1
10 TABLET ORAL 2 TIMES DAILY
Qty: 60 TABLET | Refills: 1 | Status: SHIPPED | OUTPATIENT
Start: 2022-08-30

## 2022-08-30 RX ORDER — QUETIAPINE FUMARATE 100 MG/1
100 TABLET, FILM COATED ORAL
Qty: 30 TABLET | Refills: 1 | Status: SHIPPED | OUTPATIENT
Start: 2022-08-30

## 2022-08-30 RX ORDER — METFORMIN HCL 500 MG
500 TABLET, EXTENDED RELEASE 24 HR ORAL
Qty: 30 TABLET | Refills: 1 | Status: SHIPPED | OUTPATIENT
Start: 2022-08-30

## 2022-08-30 RX ORDER — LORAZEPAM 1 MG/1
1 TABLET ORAL 2 TIMES DAILY
Qty: 60 TABLET | Refills: 1 | Status: SHIPPED | OUTPATIENT
Start: 2022-08-30

## 2022-08-30 RX ADMIN — LORAZEPAM 1 MG: 1 TABLET ORAL at 08:33

## 2022-08-30 RX ADMIN — ALUMINUM HYDROXIDE, MAGNESIUM HYDROXIDE, AND SIMETHICONE 30 ML: 200; 200; 20 SUSPENSION ORAL at 06:48

## 2022-08-30 RX ADMIN — METFORMIN HYDROCHLORIDE 500 MG: 500 TABLET, EXTENDED RELEASE ORAL at 17:07

## 2022-08-30 RX ADMIN — LORAZEPAM 1 MG: 1 TABLET ORAL at 20:13

## 2022-08-30 RX ADMIN — HALOPERIDOL 10 MG: 10 TABLET ORAL at 20:13

## 2022-08-30 RX ADMIN — ALUMINUM HYDROXIDE, MAGNESIUM HYDROXIDE, AND SIMETHICONE 30 ML: 200; 200; 20 SUSPENSION ORAL at 15:13

## 2022-08-30 RX ADMIN — ALUMINUM HYDROXIDE, MAGNESIUM HYDROXIDE, AND SIMETHICONE 30 ML: 200; 200; 20 SUSPENSION ORAL at 01:27

## 2022-08-30 RX ADMIN — QUETIAPINE 100 MG: 100 TABLET ORAL at 20:13

## 2022-08-30 RX ADMIN — ALUMINUM HYDROXIDE, MAGNESIUM HYDROXIDE, AND SIMETHICONE 30 ML: 200; 200; 20 SUSPENSION ORAL at 19:27

## 2022-08-30 RX ADMIN — HALOPERIDOL 10 MG: 10 TABLET ORAL at 08:33

## 2022-08-30 ASSESSMENT — ACTIVITIES OF DAILY LIVING (ADL)
HYGIENE/GROOMING: INDEPENDENT
ADLS_ACUITY_SCORE: 28
DRESS: INDEPENDENT
ADLS_ACUITY_SCORE: 28
ORAL_HYGIENE: INDEPENDENT
ORAL_HYGIENE: INDEPENDENT
DRESS: SCRUBS (BEHAVIORAL HEALTH);INDEPENDENT
ADLS_ACUITY_SCORE: 28
LAUNDRY: UNABLE TO COMPLETE
ADLS_ACUITY_SCORE: 28
HYGIENE/GROOMING: INDEPENDENT
ADLS_ACUITY_SCORE: 28

## 2022-08-30 NOTE — PLAN OF CARE
Face to face shift report received from Vlad DEL CASTILLO RN. Rounding completed, pt observed.    Problem: Behavioral Health Plan of Care  Goal: Patient-Specific Goal (Individualization)  Description: Patient will attend 50% of groups when able.  Patient will sleep 6-8 hours per night.  Patient will eat 75% of meals  Patient will be cooperative with treatment team recommendations  Patient will maintain appropriate behavior when on the unit.  Patient will remain independent with ADLs.  Outcome: Ongoing, Progressing  Note: Shift Summary:      Problem: Thought Process Alteration  Goal: Optimal Thought Clarity  Description: Patient will contract for safety.  Patient will report decrease in audio/visual hallucinations.  Patient will report linear thoughts prior to discharge  Patient will make needs known  Patient will exhibit decrease in paranoid thinking and behavior by discharge.     Outcome: Ongoing, Progressing   Face to face report will be communicated to oncoming RN.    Clarice Parker RN  8/30/2022

## 2022-08-30 NOTE — PLAN OF CARE
Problem: Behavioral Health Plan of Care  Goal: Plan of Care Review  Outcome: Adequate for Care Transition  Flowsheets (Taken 8/30/2022 0924)  Plan of Care Reviewed With: patient  Patient Agreement with Plan of Care: agrees  Goal: Patient-Specific Goal (Individualization)  Description: Patient will attend 50% of groups when able.  Patient will sleep 6-8 hours per night.  Patient will eat 75% of meals  Patient will be cooperative with treatment team recommendations  Patient will maintain appropriate behavior when on the unit.  Patient will remain independent with ADLs.  8/30/2022 1204 by Clarice Parker RN  Outcome: Adequate for Care Transition  8/30/2022 0742 by Clarice Parker RN  Outcome: Ongoing, Progressing  Note: Shift Summary:  Patient was awake in her room at the start of this shift.  Patient's affect less tense and more relaxed.  States that auditory hallucinations are less bothersome.  Complaint with medications. Denies any unwanted side effects.         Problem: Thought Process Alteration  Goal: Optimal Thought Clarity  Description: Patient will contract for safety.  Patient will report decrease in audio/visual hallucinations.  Patient will report linear thoughts prior to discharge  Patient will make needs known  Patient will exhibit decrease in paranoid thinking and behavior by discharge.     Face to face report will be communicated to oncoming RN.    Clarice Parker RN  8/30/2022

## 2022-08-30 NOTE — PLAN OF CARE
Pt is discharging at the recommendation of the treatment team tomorrow 8/31/22 at 9:30am. Pt is discharging to home transported by [facility]. Pt denies having any thoughts of hurting themself or anyone else. Pt has follow up with [facility] for psychiatry. Discharge instructions, including; demographic sheet, psychiatric evaluation, discharge summary, and AVS were faxed to these next level of care providers.

## 2022-08-30 NOTE — PLAN OF CARE
Problem: Behavioral Health Plan of Care  Goal: Patient-Specific Goal (Individualization)  Description: Patient will attend 50% of groups when able.  Patient will sleep 6-8 hours per night.  Patient will eat 75% of meals  Patient will be cooperative with treatment team recommendations  Patient will maintain appropriate behavior when on the unit.  Patient will remain independent with ADLs.  Outcome: Ongoing, Progressing   Goal Outcome Evaluation:      Face to face shift report received from RN. Rounding completed, pt observed.Client rested in room with eyes closed and respirations noted for 6 hours. No falls this shift. Client received Maalox 30 ml at 0127 for stomach upset. This was effective and client had no further complaints. Face to face report will be communicated to oncoming RN.    Vlad Scales RN  8/30/2022  6:21 AM

## 2022-08-30 NOTE — PLAN OF CARE
"  Problem: Behavioral Health Plan of Care  Goal: Patient-Specific Goal (Individualization)  Description: Patient will attend 50% of groups when able.  Patient will sleep 6-8 hours per night.  Patient will eat 75% of meals  Patient will be cooperative with treatment team recommendations  Patient will maintain appropriate behavior when on the unit.  Patient will remain independent with ADLs.  Outcome: Ongoing, Progressing  Note: Patient is isolative to her room most of this shift.  She was in the lounge for approximately 2 hours around dinner and 1 hour around HS snack.  She feels that she has improved and hallucinations/thoughts have improved since starting scheduled haldol and lorazepam.  She states she is \"homesick\" but understands the need for continued hospitalization to monitor for side effects and effectiveness of medications.  Cooperative with medications and assessment.  She is polite during interactions.    2022:  Patient requested and received PRN Maalox for c/o indigestion.      Problem: Thought Process Alteration  Goal: Optimal Thought Clarity  Description: Patient will contract for safety.  Patient will report decrease in audio/visual hallucinations.  Patient will report linear thoughts prior to discharge  Patient will make needs known  Patient will exhibit decrease in paranoid thinking and behavior by discharge.     Outcome: Ongoing, Progressing  Note: Patient reports improvement in thought process since starting haldol and ativan scheduled.  Patient agrees to safe behaviors.       Problem: Suicidal Behavior  Goal: Suicidal Behavior is Absent or Managed  Outcome: Ongoing, Progressing  Note: Patient had no noted self harm this shift.    Goal Outcome Evaluation:                      "

## 2022-08-30 NOTE — DISCHARGE SUMMARY
"[facility] PSYCHIATRY  DISCHARGE SUMMARY     DISCHARGE DATA     Marely Seo MRN# [id number]   Age: 42 year old YOB: 1980     Date of Admission: 8/24/2022  Date of Discharge: August 31, 2022  Discharge Provider: COTY Hendrix CNP       REASON FOR ADMISSION     This is a 42 year old female with a PMH of schizophrenia and meth use. She has not used methamphetamine in over 2 months and the auditory hallucinations continue. They are telling her that they will kill her family if she does specific things like \"use my heating pad   seh does have insight though needs significant amount of reassurance that the voices are related to mental illness and that medication adjustments will likely help decrease them significantly.        DISCHARGE DIAGNOSES     1.Schizophrenia  2.Methamphetmaine use disorder in early partial remission       CONSULTS     None       HOSPITAL COURSE     Legal status: None    Patient was admitted to unit 5 due to the aforementioned presentation. The patient was placed under 15 minute checks to ensure patient safety. The patient participated in unit programming and groups as able.    The following changes to the patient's psychiatric medications were made:    PTA medications held:     -None    PTA medications continued/changed:      -Seroquel 100 mg PRN before bed     New medications tried and stopped:      -Invega 6 mg - caused tachycardia     New medications initiated:      -Continue Haldol 10 mg and Ativan 1 mg BID   -Metformin  mg at suppertime    With these changes and supports the patient noticed improvement in their symptoms and felt sufficiently ready for discharge. As a result, Marely Seo was discharged. At the time of discharge, Marely Seo was determined to not be a danger to self or others. The patient was also medically stable for discharge. At the current time of discharge, the patient does not meet criteria for involuntary " hospitalization. On the day of discharge, the patient reports that they do not have suicidal or homicidal ideation. Steps taken to minimize risk include: assessing patient s behavior and thought process daily during hospital stay, discharging patient with adequate plan for follow up for mental and physical health and discussing safety plan of returning to the hospital should the patient ever have thoughts of harming themselves or others. Therefore, based on all available evidence including the factors cited above, the patient does not appear to be at imminent risk for self-harm, and is appropriate for outpatient level of care. However, if patient uses substances or is medication non-adherent, their risk of decompensation and SI will be elevated. This was discussed with the patient.       DISCHARGE MEDICATIONS        Review of your medicines      START taking      Dose / Directions   haloperidol 10 MG tablet  Commonly known as: HALDOL      Dose: 10 mg  Take 1 tablet (10 mg) by mouth 2 times daily  Quantity: 60 tablet  Refills: 1     LORazepam 1 MG tablet  Commonly known as: ATIVAN      Dose: 1 mg  Take 1 tablet (1 mg) by mouth 2 times daily  Quantity: 60 tablet  Refills: 1     metFORMIN 500 MG 24 hr tablet  Commonly known as: GLUCOPHAGE XR  Used for: Weight gain due to medication      Dose: 500 mg  Take 1 tablet (500 mg) by mouth daily (with dinner)  Quantity: 30 tablet  Refills: 1        CONTINUE these medicines which may have CHANGED, or have new prescriptions. If we are uncertain of the size of tablets/capsules you have at home, strength may be listed as something that might have changed.      Dose / Directions   QUEtiapine 100 MG tablet  Commonly known as: SEROquel  This may have changed:   when to take this  reasons to take this      Dose: 100 mg  Take 1 tablet (100 mg) by mouth nightly as needed (auditory hallucinations, sleep)  Quantity: 30 tablet  Refills: 1        CONTINUE these medicines which have NOT  "CHANGED      Dose / Directions   medroxyPROGESTERone 150 MG/ML syringe  Commonly known as: DEPO-PROVERA      Dose: 150 mg  Inject 150 mg into the muscle every 3 months  Refills: 0     Melatonin 10 MG Tabs tablet      Dose: 20-30 mg  Take 20-30 mg by mouth nightly as needed  Refills: 0           Where to get your medicines      These medications were sent to [facility] - [place], [place] - [address]  [address], [place] [address]    Phone: [phone]   haloperidol 10 MG tablet  LORazepam 1 MG tablet  metFORMIN 500 MG 24 hr tablet  QUEtiapine 100 MG tablet       Reason for two neuroleptics: Patient has required the use of two antipsychotics for psychiatric stabilization    MENTAL STATUS EXAM   Vitals: /90   Pulse (!) 122   Temp 98.4  F (36.9  C) (Temporal)   Resp 16   Ht 1.753 m (5' 9.02\")   Wt 106.1 kg (233 lb 14.4 oz)   SpO2 96%   BMI 34.52 kg/m      Appearance: Alert, oriented, dressed in hospital scrubs, appears stated age   Attitude: Cooperative   Eye Contact: Good  Mood: \"Better\"  Affect: blunted  Speech: Normal rate and rhythm   Psychomotor Behavior: No tremor, rigidity, or psychomotor abnormality   Thought Process: Logical, goal directed   Associations: No loose associations   Thought Content: Denies SI or plan. No SIB. Reports auditory hallucinations and delusions as \"manageable.\"  Insight: Good  Judgment: Good  Oriented to: Person, place, and time  Attention Span and Concentration: Intact  Recent and Remote Memory: Intact  Language: English with appropriate syntax and vocabulary  Fund of Knowledge: Average  Muscle Strength and Tone: Grossly normal  Gait and Station: Grossly normal       DISCHARGE PLAN     1.  Education given regarding diagnostic and treatment options with risks, benefits and alternatives with adequate verbalization of understanding.  2.  Discharge to home. Upon detailed review of risk factors, patient amenable for release.   3.  Continue " aforementioned medications and associated medication changes with follow-up by outpatient provider.  4.  Crisis management planning in place.    5.  Nursing and  to review further discharge recommendations.   6.  Patient is being discharged to home with the following appointments as detailed below.    [facility]  Medication Management- Keshawn Estrada- September 20th @11:30  [facility]-as scheduled  [address]  [place], [place] [address]  Phone: [phone]  Fax:[phone]       DISCHARGE SERVICES PROVIDED     40 minutes spent on discharge services, including:  Final examination of patient.  Review and discussion of hospital stay.  Instructions for continued outpatient care/goals.  Preparation of discharge records.  Preparation of medications refills and new prescriptions.  Preparation of applicable referral forms.        ATTESTATION     COTY Hendrix CNP       LABS THIS ADMISSION     Results for orders placed or performed during the hospital encounter of 08/24/22   Potassium     Status: Normal   Result Value Ref Range    Potassium 4.0 3.4 - 5.3 mmol/L   Renal panel     Status: Abnormal   Result Value Ref Range    Sodium 139 133 - 144 mmol/L    Potassium 4.0 3.4 - 5.3 mmol/L    Chloride 111 (H) 94 - 109 mmol/L    Carbon Dioxide (CO2) 23 20 - 32 mmol/L    Anion Gap 5 3 - 14 mmol/L    Urea Nitrogen 9 7 - 30 mg/dL    Creatinine 0.80 0.52 - 1.04 mg/dL    Calcium 9.2 8.5 - 10.1 mg/dL    Glucose 116 (H) 70 - 99 mg/dL    Albumin 3.5 3.4 - 5.0 g/dL    Phosphorus 3.5 2.5 - 4.5 mg/dL    GFR Estimate >90 >60 mL/min/1.73m2

## 2022-08-31 VITALS
TEMPERATURE: 96.9 F | SYSTOLIC BLOOD PRESSURE: 129 MMHG | HEART RATE: 123 BPM | OXYGEN SATURATION: 96 % | DIASTOLIC BLOOD PRESSURE: 85 MMHG | RESPIRATION RATE: 16 BRPM | WEIGHT: 233.9 LBS | BODY MASS INDEX: 34.64 KG/M2 | HEIGHT: 69 IN

## 2022-08-31 PROCEDURE — 250N000013 HC RX MED GY IP 250 OP 250 PS 637: Performed by: NURSE PRACTITIONER

## 2022-08-31 RX ADMIN — HALOPERIDOL 10 MG: 10 TABLET ORAL at 08:01

## 2022-08-31 RX ADMIN — LORAZEPAM 1 MG: 1 TABLET ORAL at 08:01

## 2022-08-31 ASSESSMENT — ACTIVITIES OF DAILY LIVING (ADL)
ADLS_ACUITY_SCORE: 28
ADLS_ACUITY_SCORE: 28
HYGIENE/GROOMING: INDEPENDENT
ADLS_ACUITY_SCORE: 28
ORAL_HYGIENE: INDEPENDENT
DRESS: INDEPENDENT
ADLS_ACUITY_SCORE: 28
ADLS_ACUITY_SCORE: 28

## 2022-08-31 NOTE — PLAN OF CARE
Problem: Behavioral Health Plan of Care  Goal: Patient-Specific Goal (Individualization)  Description:  Patient will attend 50% of groups when able.  Patient will sleep 6-8 hours per night.  Patient will eat 75% of meals  Patient will be cooperative with treatment team recommendations  Patient will maintain appropriate behavior when on the unit.  Patient will remain independent with ADLs.  Outcome: Ongoing, Progressing   Goal Outcome Evaluation:      Face to face shift report received from RN. Rounding completed, pt observed. Client rested in room for 7 hours with eyes closed and respirations noted. Client scheduled to discharge this AM.Face to face report will be communicated to oncoming RN.    Vlad Scales RN  8/31/2022  6:19 AM

## 2022-08-31 NOTE — PLAN OF CARE
Problem: Behavioral Health Plan of Care  Goal: Patient-Specific Goal (Individualization)  Description:  Patient will attend 50% of groups when able.  Patient will sleep 6-8 hours per night.  Patient will eat 75% of meals  Patient will be cooperative with treatment team recommendations  Patient will maintain appropriate behavior when on the unit.  Patient will remain independent with ADLs.  Outcome: Ongoing, Progressing  Note: Patient is cooperative with medications and assessment this shift.  She endorses auditory hallucinations.  Prior to dinner patient asked when she can have scheduled haldol and ativan as they help the auditory hallucinations.  These are scheduled at HS.  She states she is able to wait until HS.  Patient denies the need for PRN medication at this time.    Patient continues to be tachycardic this shift. Continue to monitor.    2013:  Patient requested and received PRN Seroquel 100 mg po for auditory hallucinations and sleep.       Problem: Thought Process Alteration  Goal: Optimal Thought Clarity  Description:   Patient will report decrease in audio/visual hallucinations.  Patient will report linear thoughts prior to discharge  Patient will make needs known  Patient will exhibit decrease in paranoid thinking and behavior by discharge  Outcome: Ongoing, Progressing  Note: Patient continues to endorse auditory hallucinations this shift.  She does not display any paranoid behavior.     Goal Outcome Evaluation:    Plan of Care Reviewed With: patient

## 2022-08-31 NOTE — PLAN OF CARE
Face to face shift report received from Vlad DEL CASTILLO RN. Rounding completed, pt observed.    Problem: Behavioral Health Plan of Care  Goal: Patient-Specific Goal (Individualization)  Description:  Patient will attend 50% of groups when able.  Patient will sleep 6-8 hours per night.  Patient will eat 75% of meals  Patient will be cooperative with treatment team recommendations  Patient will maintain appropriate behavior when on the unit.  Patient will remain independent with ADLs.  Outcome: Adequate for Care Transition  Shift Summary:  Discharge Note    Patient Discharged to home on 8/31/2022 10:32 AM via [employer] accompanied by .     Patient informed of discharge instructions in AVS. patient verbalizes understanding and denies having any questions pertaining to AVS. Patient stable at time of discharge. Patient denies SI, HI, and thoughts of self harm at time of discharge. All personal belongings returned to patient. Discharge prescriptions sent to [facility] via electronic communication. Psych evaluation, history and physical, AVS, and discharge summary faxed to next level of care by CHRISTINE Parker RN  8/31/2022  10:32 AM      Goal: Adheres to Safety Considerations for Self and Others  Outcome: Adequate for Care Transition     Problem: Thought Process Alteration  Goal: Optimal Thought Clarity  Description:   Patient will report decrease in audio/visual hallucinations.  Patient will report linear thoughts prior to discharge  Patient will make needs known  Patient will exhibit decrease in paranoid thinking and behavior by discharge  Outcome: Adequate for Care Transition

## 2023-02-02 NOTE — PLAN OF CARE
Face to face end of shift report received from PM RN. Rounding completed. Patient observed.     Demond Portillo RN     no